# Patient Record
Sex: FEMALE | URBAN - METROPOLITAN AREA
[De-identification: names, ages, dates, MRNs, and addresses within clinical notes are randomized per-mention and may not be internally consistent; named-entity substitution may affect disease eponyms.]

---

## 2019-01-01 ENCOUNTER — OUTPATIENT (OUTPATIENT)
Dept: OUTPATIENT SERVICES | Facility: HOSPITAL | Age: 84
LOS: 1 days | End: 2019-01-01
Payer: MEDICARE

## 2019-01-01 ENCOUNTER — INPATIENT (INPATIENT)
Facility: HOSPITAL | Age: 84
LOS: 10 days | DRG: 871 | End: 2020-01-01
Attending: INTERNAL MEDICINE | Admitting: INTERNAL MEDICINE
Payer: MEDICARE

## 2019-01-01 VITALS
HEART RATE: 95 BPM | DIASTOLIC BLOOD PRESSURE: 65 MMHG | RESPIRATION RATE: 22 BRPM | TEMPERATURE: 98 F | OXYGEN SATURATION: 95 % | SYSTOLIC BLOOD PRESSURE: 133 MMHG

## 2019-01-01 VITALS
OXYGEN SATURATION: 96 % | HEIGHT: 60 IN | DIASTOLIC BLOOD PRESSURE: 54 MMHG | TEMPERATURE: 100 F | HEART RATE: 60 BPM | RESPIRATION RATE: 18 BRPM | SYSTOLIC BLOOD PRESSURE: 112 MMHG | WEIGHT: 110.01 LBS

## 2019-01-01 DIAGNOSIS — J12.9 VIRAL PNEUMONIA, UNSPECIFIED: ICD-10-CM

## 2019-01-01 DIAGNOSIS — Z71.89 OTHER SPECIFIED COUNSELING: ICD-10-CM

## 2019-01-01 DIAGNOSIS — Z90.49 ACQUIRED ABSENCE OF OTHER SPECIFIED PARTS OF DIGESTIVE TRACT: Chronic | ICD-10-CM

## 2019-01-01 DIAGNOSIS — Z96.641 PRESENCE OF RIGHT ARTIFICIAL HIP JOINT: Chronic | ICD-10-CM

## 2019-01-01 LAB
ADD ON TEST-SPECIMEN IN LAB: SIGNIFICANT CHANGE UP
ALBUMIN SERPL ELPH-MCNC: 2.6 G/DL — LOW (ref 3.3–5)
ALP SERPL-CCNC: 86 U/L — SIGNIFICANT CHANGE UP (ref 40–120)
ALT FLD-CCNC: 12 U/L — SIGNIFICANT CHANGE UP (ref 12–78)
ANION GAP SERPL CALC-SCNC: 6 MMOL/L — SIGNIFICANT CHANGE UP (ref 5–17)
ANION GAP SERPL CALC-SCNC: 7 MMOL/L — SIGNIFICANT CHANGE UP (ref 5–17)
ANION GAP SERPL CALC-SCNC: 7 MMOL/L — SIGNIFICANT CHANGE UP (ref 5–17)
ANION GAP SERPL CALC-SCNC: 9 MMOL/L — SIGNIFICANT CHANGE UP (ref 5–17)
APPEARANCE UR: CLEAR — SIGNIFICANT CHANGE UP
APTT BLD: 26.7 SEC — LOW (ref 27.5–36.3)
AST SERPL-CCNC: 14 U/L — LOW (ref 15–37)
BASOPHILS # BLD AUTO: 0.03 K/UL — SIGNIFICANT CHANGE UP (ref 0–0.2)
BASOPHILS # BLD AUTO: 0.07 K/UL — SIGNIFICANT CHANGE UP (ref 0–0.2)
BASOPHILS NFR BLD AUTO: 0.3 % — SIGNIFICANT CHANGE UP (ref 0–2)
BASOPHILS NFR BLD AUTO: 0.8 % — SIGNIFICANT CHANGE UP (ref 0–2)
BILIRUB SERPL-MCNC: 0.5 MG/DL — SIGNIFICANT CHANGE UP (ref 0.2–1.2)
BILIRUB UR-MCNC: NEGATIVE — SIGNIFICANT CHANGE UP
BUN SERPL-MCNC: 13 MG/DL — SIGNIFICANT CHANGE UP (ref 7–23)
BUN SERPL-MCNC: 16 MG/DL — SIGNIFICANT CHANGE UP (ref 7–23)
BUN SERPL-MCNC: 16 MG/DL — SIGNIFICANT CHANGE UP (ref 7–23)
BUN SERPL-MCNC: 18 MG/DL — SIGNIFICANT CHANGE UP (ref 7–23)
CALCIUM SERPL-MCNC: 7.6 MG/DL — LOW (ref 8.5–10.1)
CALCIUM SERPL-MCNC: 7.7 MG/DL — LOW (ref 8.5–10.1)
CALCIUM SERPL-MCNC: 7.8 MG/DL — LOW (ref 8.5–10.1)
CALCIUM SERPL-MCNC: 8.1 MG/DL — LOW (ref 8.5–10.1)
CHLORIDE SERPL-SCNC: 107 MMOL/L — SIGNIFICANT CHANGE UP (ref 96–108)
CHLORIDE SERPL-SCNC: 112 MMOL/L — HIGH (ref 96–108)
CHLORIDE SERPL-SCNC: 112 MMOL/L — HIGH (ref 96–108)
CHLORIDE SERPL-SCNC: 114 MMOL/L — HIGH (ref 96–108)
CO2 SERPL-SCNC: 21 MMOL/L — LOW (ref 22–31)
COLOR SPEC: YELLOW — SIGNIFICANT CHANGE UP
CREAT SERPL-MCNC: 1.16 MG/DL — SIGNIFICANT CHANGE UP (ref 0.5–1.3)
CREAT SERPL-MCNC: 1.3 MG/DL — SIGNIFICANT CHANGE UP (ref 0.5–1.3)
CREAT SERPL-MCNC: 1.35 MG/DL — HIGH (ref 0.5–1.3)
CREAT SERPL-MCNC: 1.64 MG/DL — HIGH (ref 0.5–1.3)
CULTURE RESULTS: SIGNIFICANT CHANGE UP
CULTURE RESULTS: SIGNIFICANT CHANGE UP
DIFF PNL FLD: NEGATIVE — SIGNIFICANT CHANGE UP
EOSINOPHIL # BLD AUTO: 0.47 K/UL — SIGNIFICANT CHANGE UP (ref 0–0.5)
EOSINOPHIL # BLD AUTO: 0.5 K/UL — SIGNIFICANT CHANGE UP (ref 0–0.5)
EOSINOPHIL NFR BLD AUTO: 4.6 % — SIGNIFICANT CHANGE UP (ref 0–6)
EOSINOPHIL NFR BLD AUTO: 5.6 % — SIGNIFICANT CHANGE UP (ref 0–6)
GLUCOSE SERPL-MCNC: 117 MG/DL — HIGH (ref 70–99)
GLUCOSE SERPL-MCNC: 77 MG/DL — SIGNIFICANT CHANGE UP (ref 70–99)
GLUCOSE SERPL-MCNC: 91 MG/DL — SIGNIFICANT CHANGE UP (ref 70–99)
GLUCOSE SERPL-MCNC: 92 MG/DL — SIGNIFICANT CHANGE UP (ref 70–99)
GLUCOSE UR QL: NEGATIVE MG/DL — SIGNIFICANT CHANGE UP
HCT VFR BLD CALC: 31.2 % — LOW (ref 34.5–45)
HCT VFR BLD CALC: 31.8 % — LOW (ref 34.5–45)
HCT VFR BLD CALC: 35.8 % — SIGNIFICANT CHANGE UP (ref 34.5–45)
HGB BLD-MCNC: 10 G/DL — LOW (ref 11.5–15.5)
HGB BLD-MCNC: 11.1 G/DL — LOW (ref 11.5–15.5)
HGB BLD-MCNC: 9.8 G/DL — LOW (ref 11.5–15.5)
IMM GRANULOCYTES NFR BLD AUTO: 0.6 % — SIGNIFICANT CHANGE UP (ref 0–1.5)
IMM GRANULOCYTES NFR BLD AUTO: 0.6 % — SIGNIFICANT CHANGE UP (ref 0–1.5)
INR BLD: 1.1 RATIO — SIGNIFICANT CHANGE UP (ref 0.88–1.16)
KETONES UR-MCNC: NEGATIVE — SIGNIFICANT CHANGE UP
LACTATE SERPL-SCNC: 2.8 MMOL/L — HIGH (ref 0.7–2)
LEUKOCYTE ESTERASE UR-ACNC: NEGATIVE — SIGNIFICANT CHANGE UP
LYMPHOCYTES # BLD AUTO: 1.42 K/UL — SIGNIFICANT CHANGE UP (ref 1–3.3)
LYMPHOCYTES # BLD AUTO: 1.8 K/UL — SIGNIFICANT CHANGE UP (ref 1–3.3)
LYMPHOCYTES # BLD AUTO: 14 % — SIGNIFICANT CHANGE UP (ref 13–44)
LYMPHOCYTES # BLD AUTO: 20.2 % — SIGNIFICANT CHANGE UP (ref 13–44)
MCHC RBC-ENTMCNC: 25.5 PG — LOW (ref 27–34)
MCHC RBC-ENTMCNC: 25.6 PG — LOW (ref 27–34)
MCHC RBC-ENTMCNC: 26.2 PG — LOW (ref 27–34)
MCHC RBC-ENTMCNC: 31 GM/DL — LOW (ref 32–36)
MCHC RBC-ENTMCNC: 31.4 GM/DL — LOW (ref 32–36)
MCHC RBC-ENTMCNC: 31.4 GM/DL — LOW (ref 32–36)
MCV RBC AUTO: 81 FL — SIGNIFICANT CHANGE UP (ref 80–100)
MCV RBC AUTO: 82.5 FL — SIGNIFICANT CHANGE UP (ref 80–100)
MCV RBC AUTO: 83.5 FL — SIGNIFICANT CHANGE UP (ref 80–100)
MONOCYTES # BLD AUTO: 0.93 K/UL — HIGH (ref 0–0.9)
MONOCYTES # BLD AUTO: 1.23 K/UL — HIGH (ref 0–0.9)
MONOCYTES NFR BLD AUTO: 13.8 % — SIGNIFICANT CHANGE UP (ref 2–14)
MONOCYTES NFR BLD AUTO: 9.2 % — SIGNIFICANT CHANGE UP (ref 2–14)
NEUTROPHILS # BLD AUTO: 5.28 K/UL — SIGNIFICANT CHANGE UP (ref 1.8–7.4)
NEUTROPHILS # BLD AUTO: 7.23 K/UL — SIGNIFICANT CHANGE UP (ref 1.8–7.4)
NEUTROPHILS NFR BLD AUTO: 59 % — SIGNIFICANT CHANGE UP (ref 43–77)
NEUTROPHILS NFR BLD AUTO: 71.3 % — SIGNIFICANT CHANGE UP (ref 43–77)
NITRITE UR-MCNC: NEGATIVE — SIGNIFICANT CHANGE UP
PH UR: 5 — SIGNIFICANT CHANGE UP (ref 5–8)
PLATELET # BLD AUTO: 322 K/UL — SIGNIFICANT CHANGE UP (ref 150–400)
PLATELET # BLD AUTO: 338 K/UL — SIGNIFICANT CHANGE UP (ref 150–400)
PLATELET # BLD AUTO: 421 K/UL — HIGH (ref 150–400)
POTASSIUM SERPL-MCNC: 3.8 MMOL/L — SIGNIFICANT CHANGE UP (ref 3.5–5.3)
POTASSIUM SERPL-MCNC: 4 MMOL/L — SIGNIFICANT CHANGE UP (ref 3.5–5.3)
POTASSIUM SERPL-MCNC: 4.1 MMOL/L — SIGNIFICANT CHANGE UP (ref 3.5–5.3)
POTASSIUM SERPL-MCNC: 4.1 MMOL/L — SIGNIFICANT CHANGE UP (ref 3.5–5.3)
POTASSIUM SERPL-SCNC: 3.8 MMOL/L — SIGNIFICANT CHANGE UP (ref 3.5–5.3)
POTASSIUM SERPL-SCNC: 4 MMOL/L — SIGNIFICANT CHANGE UP (ref 3.5–5.3)
POTASSIUM SERPL-SCNC: 4.1 MMOL/L — SIGNIFICANT CHANGE UP (ref 3.5–5.3)
POTASSIUM SERPL-SCNC: 4.1 MMOL/L — SIGNIFICANT CHANGE UP (ref 3.5–5.3)
PROT SERPL-MCNC: 6 GM/DL — SIGNIFICANT CHANGE UP (ref 6–8.3)
PROT UR-MCNC: 15 MG/DL
PROTHROM AB SERPL-ACNC: 12.3 SEC — SIGNIFICANT CHANGE UP (ref 10–12.9)
RAPID RVP RESULT: SIGNIFICANT CHANGE UP
RBC # BLD: 3.81 M/UL — SIGNIFICANT CHANGE UP (ref 3.8–5.2)
RBC # BLD: 3.85 M/UL — SIGNIFICANT CHANGE UP (ref 3.8–5.2)
RBC # BLD: 4.34 M/UL — SIGNIFICANT CHANGE UP (ref 3.8–5.2)
RBC # FLD: 18.4 % — HIGH (ref 10.3–14.5)
RBC # FLD: 18.6 % — HIGH (ref 10.3–14.5)
RBC # FLD: 18.6 % — HIGH (ref 10.3–14.5)
SODIUM SERPL-SCNC: 137 MMOL/L — SIGNIFICANT CHANGE UP (ref 135–145)
SODIUM SERPL-SCNC: 139 MMOL/L — SIGNIFICANT CHANGE UP (ref 135–145)
SODIUM SERPL-SCNC: 140 MMOL/L — SIGNIFICANT CHANGE UP (ref 135–145)
SODIUM SERPL-SCNC: 142 MMOL/L — SIGNIFICANT CHANGE UP (ref 135–145)
SP GR SPEC: 1.02 — SIGNIFICANT CHANGE UP (ref 1.01–1.02)
SPECIMEN SOURCE: SIGNIFICANT CHANGE UP
SPECIMEN SOURCE: SIGNIFICANT CHANGE UP
TROPONIN I SERPL-MCNC: <0.015 NG/ML — SIGNIFICANT CHANGE UP (ref 0.01–0.04)
UROBILINOGEN FLD QL: NEGATIVE MG/DL — SIGNIFICANT CHANGE UP
WBC # BLD: 10.14 K/UL — SIGNIFICANT CHANGE UP (ref 3.8–10.5)
WBC # BLD: 10.92 K/UL — HIGH (ref 3.8–10.5)
WBC # BLD: 8.93 K/UL — SIGNIFICANT CHANGE UP (ref 3.8–10.5)
WBC # FLD AUTO: 10.14 K/UL — SIGNIFICANT CHANGE UP (ref 3.8–10.5)
WBC # FLD AUTO: 10.92 K/UL — HIGH (ref 3.8–10.5)
WBC # FLD AUTO: 8.93 K/UL — SIGNIFICANT CHANGE UP (ref 3.8–10.5)

## 2019-01-01 PROCEDURE — 85027 COMPLETE CBC AUTOMATED: CPT

## 2019-01-01 PROCEDURE — 71250 CT THORAX DX C-: CPT

## 2019-01-01 PROCEDURE — 85025 COMPLETE CBC W/AUTO DIFF WBC: CPT

## 2019-01-01 PROCEDURE — 94640 AIRWAY INHALATION TREATMENT: CPT

## 2019-01-01 PROCEDURE — 36415 COLL VENOUS BLD VENIPUNCTURE: CPT

## 2019-01-01 PROCEDURE — G9001: CPT

## 2019-01-01 PROCEDURE — 71045 X-RAY EXAM CHEST 1 VIEW: CPT

## 2019-01-01 PROCEDURE — 93010 ELECTROCARDIOGRAM REPORT: CPT

## 2019-01-01 PROCEDURE — 93005 ELECTROCARDIOGRAM TRACING: CPT

## 2019-01-01 PROCEDURE — 83605 ASSAY OF LACTIC ACID: CPT

## 2019-01-01 PROCEDURE — 80048 BASIC METABOLIC PNL TOTAL CA: CPT

## 2019-01-01 PROCEDURE — 71045 X-RAY EXAM CHEST 1 VIEW: CPT | Mod: 26

## 2019-01-01 PROCEDURE — 83880 ASSAY OF NATRIURETIC PEPTIDE: CPT

## 2019-01-01 PROCEDURE — 93971 EXTREMITY STUDY: CPT | Mod: LT

## 2019-01-01 PROCEDURE — 93971 EXTREMITY STUDY: CPT | Mod: 26,LT

## 2019-01-01 PROCEDURE — 71250 CT THORAX DX C-: CPT | Mod: 26

## 2019-01-01 RX ORDER — AMOXICILLIN 250 MG/5ML
0 SUSPENSION, RECONSTITUTED, ORAL (ML) ORAL
Qty: 0 | Refills: 0 | DISCHARGE

## 2019-01-01 RX ORDER — METOPROLOL TARTRATE 50 MG
25 TABLET ORAL
Refills: 0 | Status: DISCONTINUED | OUTPATIENT
Start: 2019-01-01 | End: 2020-01-01

## 2019-01-01 RX ORDER — HEPARIN SODIUM 5000 [USP'U]/ML
5000 INJECTION INTRAVENOUS; SUBCUTANEOUS EVERY 12 HOURS
Refills: 0 | Status: DISCONTINUED | OUTPATIENT
Start: 2019-01-01 | End: 2020-01-01

## 2019-01-01 RX ORDER — SENNA PLUS 8.6 MG/1
2 TABLET ORAL AT BEDTIME
Refills: 0 | Status: DISCONTINUED | OUTPATIENT
Start: 2019-01-01 | End: 2020-01-01

## 2019-01-01 RX ORDER — CEFTRIAXONE 500 MG/1
1000 INJECTION, POWDER, FOR SOLUTION INTRAMUSCULAR; INTRAVENOUS EVERY 24 HOURS
Refills: 0 | Status: COMPLETED | OUTPATIENT
Start: 2019-01-01 | End: 2019-01-01

## 2019-01-01 RX ORDER — SODIUM CHLORIDE 0.65 %
2 AEROSOL, SPRAY (ML) NASAL EVERY 4 HOURS
Refills: 0 | Status: DISCONTINUED | OUTPATIENT
Start: 2019-01-01 | End: 2020-01-01

## 2019-01-01 RX ORDER — ONDANSETRON 8 MG/1
4 TABLET, FILM COATED ORAL EVERY 6 HOURS
Refills: 0 | Status: DISCONTINUED | OUTPATIENT
Start: 2019-01-01 | End: 2020-01-01

## 2019-01-01 RX ORDER — ACETAMINOPHEN 500 MG
650 TABLET ORAL ONCE
Refills: 0 | Status: COMPLETED | OUTPATIENT
Start: 2019-01-01 | End: 2019-01-01

## 2019-01-01 RX ORDER — MORPHINE SULFATE 50 MG/1
2 CAPSULE, EXTENDED RELEASE ORAL
Refills: 0 | Status: DISCONTINUED | OUTPATIENT
Start: 2019-01-01 | End: 2020-01-01

## 2019-01-01 RX ORDER — SODIUM CHLORIDE 9 MG/ML
1000 INJECTION INTRAMUSCULAR; INTRAVENOUS; SUBCUTANEOUS
Refills: 0 | Status: DISCONTINUED | OUTPATIENT
Start: 2019-01-01 | End: 2019-01-01

## 2019-01-01 RX ORDER — FERROUS SULFATE 325(65) MG
0.5 TABLET ORAL
Qty: 0 | Refills: 0 | DISCHARGE

## 2019-01-01 RX ORDER — METOPROLOL TARTRATE 50 MG
1 TABLET ORAL
Qty: 0 | Refills: 0 | DISCHARGE

## 2019-01-01 RX ORDER — MEMANTINE HYDROCHLORIDE 10 MG/1
10 TABLET ORAL DAILY
Refills: 0 | Status: DISCONTINUED | OUTPATIENT
Start: 2019-01-01 | End: 2019-01-01

## 2019-01-01 RX ORDER — CEFTRIAXONE 500 MG/1
1000 INJECTION, POWDER, FOR SOLUTION INTRAMUSCULAR; INTRAVENOUS EVERY 24 HOURS
Refills: 0 | Status: DISCONTINUED | OUTPATIENT
Start: 2019-01-01 | End: 2019-01-01

## 2019-01-01 RX ORDER — AZITHROMYCIN 500 MG/1
500 TABLET, FILM COATED ORAL ONCE
Refills: 0 | Status: COMPLETED | OUTPATIENT
Start: 2019-01-01 | End: 2019-01-01

## 2019-01-01 RX ORDER — IPRATROPIUM/ALBUTEROL SULFATE 18-103MCG
3 AEROSOL WITH ADAPTER (GRAM) INHALATION EVERY 6 HOURS
Refills: 0 | Status: DISCONTINUED | OUTPATIENT
Start: 2019-01-01 | End: 2020-01-01

## 2019-01-01 RX ORDER — BUDESONIDE, MICRONIZED 100 %
0.5 POWDER (GRAM) MISCELLANEOUS EVERY 12 HOURS
Refills: 0 | Status: DISCONTINUED | OUTPATIENT
Start: 2019-01-01 | End: 2020-01-01

## 2019-01-01 RX ORDER — ACETAMINOPHEN 500 MG
650 TABLET ORAL EVERY 6 HOURS
Refills: 0 | Status: DISCONTINUED | OUTPATIENT
Start: 2019-01-01 | End: 2020-01-01

## 2019-01-01 RX ORDER — DONEPEZIL HYDROCHLORIDE 10 MG/1
10 TABLET, FILM COATED ORAL AT BEDTIME
Refills: 0 | Status: DISCONTINUED | OUTPATIENT
Start: 2019-01-01 | End: 2019-01-01

## 2019-01-01 RX ORDER — CEFTRIAXONE 500 MG/1
1000 INJECTION, POWDER, FOR SOLUTION INTRAMUSCULAR; INTRAVENOUS ONCE
Refills: 0 | Status: COMPLETED | OUTPATIENT
Start: 2019-01-01 | End: 2019-01-01

## 2019-01-01 RX ORDER — PSEUDOEPHEDRINE HCL 30 MG
0 TABLET ORAL
Qty: 0 | Refills: 0 | DISCHARGE

## 2019-01-01 RX ORDER — SODIUM CHLORIDE 9 MG/ML
3 INJECTION INTRAMUSCULAR; INTRAVENOUS; SUBCUTANEOUS ONCE
Refills: 0 | Status: COMPLETED | OUTPATIENT
Start: 2019-01-01 | End: 2019-01-01

## 2019-01-01 RX ORDER — MEMANTINE HYDROCHLORIDE 10 MG/1
1 TABLET ORAL
Qty: 0 | Refills: 0 | DISCHARGE

## 2019-01-01 RX ORDER — AZITHROMYCIN 500 MG/1
500 TABLET, FILM COATED ORAL EVERY 24 HOURS
Refills: 0 | Status: COMPLETED | OUTPATIENT
Start: 2019-01-01 | End: 2019-01-01

## 2019-01-01 RX ORDER — MORPHINE SULFATE 50 MG/1
2 CAPSULE, EXTENDED RELEASE ORAL EVERY 4 HOURS
Refills: 0 | Status: DISCONTINUED | OUTPATIENT
Start: 2019-01-01 | End: 2019-01-01

## 2019-01-01 RX ORDER — DONEPEZIL HYDROCHLORIDE 10 MG/1
1 TABLET, FILM COATED ORAL
Qty: 0 | Refills: 0 | DISCHARGE

## 2019-01-01 RX ORDER — SODIUM CHLORIDE 9 MG/ML
1500 INJECTION INTRAMUSCULAR; INTRAVENOUS; SUBCUTANEOUS ONCE
Refills: 0 | Status: COMPLETED | OUTPATIENT
Start: 2019-01-01 | End: 2019-01-01

## 2019-01-01 RX ORDER — FLUTICASONE PROPIONATE 50 MCG
1 SPRAY, SUSPENSION NASAL
Refills: 0 | Status: DISCONTINUED | OUTPATIENT
Start: 2019-01-01 | End: 2020-01-01

## 2019-01-01 RX ADMIN — Medication 1 SPRAY(S): at 05:50

## 2019-01-01 RX ADMIN — Medication 1 SPRAY(S): at 05:32

## 2019-01-01 RX ADMIN — HEPARIN SODIUM 5000 UNIT(S): 5000 INJECTION INTRAVENOUS; SUBCUTANEOUS at 05:07

## 2019-01-01 RX ADMIN — HEPARIN SODIUM 5000 UNIT(S): 5000 INJECTION INTRAVENOUS; SUBCUTANEOUS at 18:20

## 2019-01-01 RX ADMIN — CEFTRIAXONE 1000 MILLIGRAM(S): 500 INJECTION, POWDER, FOR SOLUTION INTRAMUSCULAR; INTRAVENOUS at 15:44

## 2019-01-01 RX ADMIN — DONEPEZIL HYDROCHLORIDE 10 MILLIGRAM(S): 10 TABLET, FILM COATED ORAL at 21:35

## 2019-01-01 RX ADMIN — SODIUM CHLORIDE 1500 MILLILITER(S): 9 INJECTION INTRAMUSCULAR; INTRAVENOUS; SUBCUTANEOUS at 21:58

## 2019-01-01 RX ADMIN — Medication 1200 MILLIGRAM(S): at 05:51

## 2019-01-01 RX ADMIN — MORPHINE SULFATE 2 MILLIGRAM(S): 50 CAPSULE, EXTENDED RELEASE ORAL at 18:17

## 2019-01-01 RX ADMIN — Medication 0.25 MILLIGRAM(S): at 23:29

## 2019-01-01 RX ADMIN — Medication 1200 MILLIGRAM(S): at 18:20

## 2019-01-01 RX ADMIN — SODIUM CHLORIDE 1500 MILLILITER(S): 9 INJECTION INTRAMUSCULAR; INTRAVENOUS; SUBCUTANEOUS at 23:10

## 2019-01-01 RX ADMIN — Medication 3 MILLILITER(S): at 13:44

## 2019-01-01 RX ADMIN — MORPHINE SULFATE 2 MILLIGRAM(S): 50 CAPSULE, EXTENDED RELEASE ORAL at 12:59

## 2019-01-01 RX ADMIN — CEFTRIAXONE 1000 MILLIGRAM(S): 500 INJECTION, POWDER, FOR SOLUTION INTRAMUSCULAR; INTRAVENOUS at 21:58

## 2019-01-01 RX ADMIN — Medication 0.5 MILLIGRAM(S): at 09:39

## 2019-01-01 RX ADMIN — Medication 0.5 MILLIGRAM(S): at 17:59

## 2019-01-01 RX ADMIN — AZITHROMYCIN 255 MILLIGRAM(S): 500 TABLET, FILM COATED ORAL at 01:34

## 2019-01-01 RX ADMIN — Medication 3 MILLILITER(S): at 20:50

## 2019-01-01 RX ADMIN — MORPHINE SULFATE 2 MILLIGRAM(S): 50 CAPSULE, EXTENDED RELEASE ORAL at 11:44

## 2019-01-01 RX ADMIN — MORPHINE SULFATE 2 MILLIGRAM(S): 50 CAPSULE, EXTENDED RELEASE ORAL at 13:30

## 2019-01-01 RX ADMIN — MEMANTINE HYDROCHLORIDE 10 MILLIGRAM(S): 10 TABLET ORAL at 11:01

## 2019-01-01 RX ADMIN — HEPARIN SODIUM 5000 UNIT(S): 5000 INJECTION INTRAVENOUS; SUBCUTANEOUS at 17:42

## 2019-01-01 RX ADMIN — DONEPEZIL HYDROCHLORIDE 10 MILLIGRAM(S): 10 TABLET, FILM COATED ORAL at 22:09

## 2019-01-01 RX ADMIN — HEPARIN SODIUM 5000 UNIT(S): 5000 INJECTION INTRAVENOUS; SUBCUTANEOUS at 05:41

## 2019-01-01 RX ADMIN — Medication 25 MILLIGRAM(S): at 17:43

## 2019-01-01 RX ADMIN — SODIUM CHLORIDE 3 MILLILITER(S): 9 INJECTION INTRAMUSCULAR; INTRAVENOUS; SUBCUTANEOUS at 21:24

## 2019-01-01 RX ADMIN — AZITHROMYCIN 500 MILLIGRAM(S): 500 TABLET, FILM COATED ORAL at 01:09

## 2019-01-01 RX ADMIN — Medication 0.5 MILLIGRAM(S): at 09:37

## 2019-01-01 RX ADMIN — Medication 1 SPRAY(S): at 05:07

## 2019-01-01 RX ADMIN — Medication 1 SPRAY(S): at 05:16

## 2019-01-01 RX ADMIN — Medication 650 MILLIGRAM(S): at 21:10

## 2019-01-01 RX ADMIN — HEPARIN SODIUM 5000 UNIT(S): 5000 INJECTION INTRAVENOUS; SUBCUTANEOUS at 17:22

## 2019-01-01 RX ADMIN — MORPHINE SULFATE 2 MILLIGRAM(S): 50 CAPSULE, EXTENDED RELEASE ORAL at 11:30

## 2019-01-01 RX ADMIN — HEPARIN SODIUM 5000 UNIT(S): 5000 INJECTION INTRAVENOUS; SUBCUTANEOUS at 17:26

## 2019-01-01 RX ADMIN — Medication 0.5 MILLIGRAM(S): at 08:05

## 2019-01-01 RX ADMIN — HEPARIN SODIUM 5000 UNIT(S): 5000 INJECTION INTRAVENOUS; SUBCUTANEOUS at 05:50

## 2019-01-01 RX ADMIN — Medication 1 SPRAY(S): at 17:59

## 2019-01-01 RX ADMIN — DONEPEZIL HYDROCHLORIDE 10 MILLIGRAM(S): 10 TABLET, FILM COATED ORAL at 21:10

## 2019-01-01 RX ADMIN — CEFTRIAXONE 1000 MILLIGRAM(S): 500 INJECTION, POWDER, FOR SOLUTION INTRAMUSCULAR; INTRAVENOUS at 21:11

## 2019-01-01 RX ADMIN — HEPARIN SODIUM 5000 UNIT(S): 5000 INJECTION INTRAVENOUS; SUBCUTANEOUS at 17:59

## 2019-01-01 RX ADMIN — HEPARIN SODIUM 5000 UNIT(S): 5000 INJECTION INTRAVENOUS; SUBCUTANEOUS at 05:26

## 2019-01-01 RX ADMIN — CEFTRIAXONE 1000 MILLIGRAM(S): 500 INJECTION, POWDER, FOR SOLUTION INTRAMUSCULAR; INTRAVENOUS at 15:55

## 2019-01-01 RX ADMIN — HEPARIN SODIUM 5000 UNIT(S): 5000 INJECTION INTRAVENOUS; SUBCUTANEOUS at 05:17

## 2019-01-01 RX ADMIN — Medication 650 MILLIGRAM(S): at 22:15

## 2019-01-01 RX ADMIN — Medication 3 MILLILITER(S): at 20:49

## 2019-01-01 RX ADMIN — Medication 0.5 MILLIGRAM(S): at 20:01

## 2019-01-01 RX ADMIN — Medication 0.5 MILLIGRAM(S): at 20:22

## 2019-01-01 RX ADMIN — MORPHINE SULFATE 2 MILLIGRAM(S): 50 CAPSULE, EXTENDED RELEASE ORAL at 15:33

## 2019-01-01 RX ADMIN — Medication 25 MILLIGRAM(S): at 05:56

## 2019-01-01 RX ADMIN — CEFTRIAXONE 1000 MILLIGRAM(S): 500 INJECTION, POWDER, FOR SOLUTION INTRAMUSCULAR; INTRAVENOUS at 22:09

## 2019-01-01 RX ADMIN — SODIUM CHLORIDE 75 MILLILITER(S): 9 INJECTION INTRAMUSCULAR; INTRAVENOUS; SUBCUTANEOUS at 18:44

## 2019-01-01 RX ADMIN — Medication 1 SPRAY(S): at 17:22

## 2019-01-01 RX ADMIN — Medication 0.5 MILLIGRAM(S): at 20:50

## 2019-01-01 RX ADMIN — Medication 25 MILLIGRAM(S): at 05:32

## 2019-01-01 RX ADMIN — HEPARIN SODIUM 5000 UNIT(S): 5000 INJECTION INTRAVENOUS; SUBCUTANEOUS at 17:07

## 2019-01-01 RX ADMIN — MORPHINE SULFATE 2 MILLIGRAM(S): 50 CAPSULE, EXTENDED RELEASE ORAL at 20:45

## 2019-01-01 RX ADMIN — Medication 25 MILLIGRAM(S): at 05:16

## 2019-01-01 RX ADMIN — Medication 3 MILLILITER(S): at 09:38

## 2019-01-01 RX ADMIN — Medication 25 MILLIGRAM(S): at 18:33

## 2019-01-01 RX ADMIN — MORPHINE SULFATE 2 MILLIGRAM(S): 50 CAPSULE, EXTENDED RELEASE ORAL at 15:18

## 2019-01-01 RX ADMIN — DONEPEZIL HYDROCHLORIDE 10 MILLIGRAM(S): 10 TABLET, FILM COATED ORAL at 21:20

## 2019-01-01 RX ADMIN — Medication 1200 MILLIGRAM(S): at 05:41

## 2019-01-01 RX ADMIN — SODIUM CHLORIDE 60 MILLILITER(S): 9 INJECTION INTRAMUSCULAR; INTRAVENOUS; SUBCUTANEOUS at 12:00

## 2019-01-01 RX ADMIN — DONEPEZIL HYDROCHLORIDE 10 MILLIGRAM(S): 10 TABLET, FILM COATED ORAL at 21:33

## 2019-01-01 RX ADMIN — CEFTRIAXONE 1000 MILLIGRAM(S): 500 INJECTION, POWDER, FOR SOLUTION INTRAMUSCULAR; INTRAVENOUS at 21:19

## 2019-01-01 RX ADMIN — Medication 3 MILLILITER(S): at 08:32

## 2019-01-01 RX ADMIN — HEPARIN SODIUM 5000 UNIT(S): 5000 INJECTION INTRAVENOUS; SUBCUTANEOUS at 18:08

## 2019-01-01 RX ADMIN — CEFTRIAXONE 1000 MILLIGRAM(S): 500 INJECTION, POWDER, FOR SOLUTION INTRAMUSCULAR; INTRAVENOUS at 21:35

## 2019-01-01 RX ADMIN — MEMANTINE HYDROCHLORIDE 10 MILLIGRAM(S): 10 TABLET ORAL at 12:13

## 2019-01-01 RX ADMIN — SODIUM CHLORIDE 60 MILLILITER(S): 9 INJECTION INTRAMUSCULAR; INTRAVENOUS; SUBCUTANEOUS at 05:50

## 2019-01-01 RX ADMIN — Medication 0.5 MILLIGRAM(S): at 08:08

## 2019-01-01 RX ADMIN — Medication 25 MILLIGRAM(S): at 05:07

## 2019-01-01 RX ADMIN — Medication 3 MILLILITER(S): at 20:01

## 2019-01-01 RX ADMIN — Medication 3 MILLILITER(S): at 13:36

## 2019-01-01 RX ADMIN — Medication 0.5 MILLIGRAM(S): at 18:08

## 2019-01-01 RX ADMIN — HEPARIN SODIUM 5000 UNIT(S): 5000 INJECTION INTRAVENOUS; SUBCUTANEOUS at 05:51

## 2019-01-01 RX ADMIN — Medication 25 MILLIGRAM(S): at 18:09

## 2019-01-01 RX ADMIN — Medication 3 MILLILITER(S): at 14:04

## 2019-01-01 RX ADMIN — Medication 1200 MILLIGRAM(S): at 05:32

## 2019-01-01 RX ADMIN — Medication 3 MILLILITER(S): at 08:08

## 2019-01-01 RX ADMIN — MORPHINE SULFATE 2 MILLIGRAM(S): 50 CAPSULE, EXTENDED RELEASE ORAL at 18:14

## 2019-01-01 RX ADMIN — Medication 3 MILLILITER(S): at 01:31

## 2019-01-01 RX ADMIN — Medication 3 MILLILITER(S): at 23:05

## 2019-01-01 RX ADMIN — AZITHROMYCIN 255 MILLIGRAM(S): 500 TABLET, FILM COATED ORAL at 23:53

## 2019-01-01 RX ADMIN — HEPARIN SODIUM 5000 UNIT(S): 5000 INJECTION INTRAVENOUS; SUBCUTANEOUS at 20:07

## 2019-01-01 RX ADMIN — Medication 25 MILLIGRAM(S): at 20:07

## 2019-01-01 RX ADMIN — Medication 1 SPRAY(S): at 17:26

## 2019-01-01 RX ADMIN — AZITHROMYCIN 255 MILLIGRAM(S): 500 TABLET, FILM COATED ORAL at 00:18

## 2019-01-01 RX ADMIN — Medication 25 MILLIGRAM(S): at 17:26

## 2019-01-01 RX ADMIN — Medication 25 MILLIGRAM(S): at 05:51

## 2019-01-01 RX ADMIN — Medication 3 MILLILITER(S): at 07:47

## 2019-01-01 RX ADMIN — Medication 1 SPRAY(S): at 18:23

## 2019-01-01 RX ADMIN — Medication 3 MILLILITER(S): at 08:04

## 2019-01-01 RX ADMIN — MORPHINE SULFATE 2 MILLIGRAM(S): 50 CAPSULE, EXTENDED RELEASE ORAL at 11:29

## 2019-01-01 RX ADMIN — Medication 1 SPRAY(S): at 18:09

## 2019-01-01 RX ADMIN — Medication 0.5 MILLIGRAM(S): at 08:32

## 2019-01-01 RX ADMIN — MORPHINE SULFATE 2 MILLIGRAM(S): 50 CAPSULE, EXTENDED RELEASE ORAL at 11:09

## 2019-01-01 RX ADMIN — MEMANTINE HYDROCHLORIDE 10 MILLIGRAM(S): 10 TABLET ORAL at 12:23

## 2019-01-01 RX ADMIN — Medication 1200 MILLIGRAM(S): at 17:26

## 2019-01-01 RX ADMIN — AZITHROMYCIN 255 MILLIGRAM(S): 500 TABLET, FILM COATED ORAL at 01:08

## 2019-01-01 RX ADMIN — Medication 1 SPRAY(S): at 17:44

## 2019-01-01 RX ADMIN — Medication 1 SPRAY(S): at 05:52

## 2019-01-01 RX ADMIN — DONEPEZIL HYDROCHLORIDE 10 MILLIGRAM(S): 10 TABLET, FILM COATED ORAL at 21:19

## 2019-01-01 RX ADMIN — Medication 3 MILLILITER(S): at 20:22

## 2019-01-01 RX ADMIN — MEMANTINE HYDROCHLORIDE 10 MILLIGRAM(S): 10 TABLET ORAL at 12:50

## 2019-01-01 RX ADMIN — MEMANTINE HYDROCHLORIDE 10 MILLIGRAM(S): 10 TABLET ORAL at 11:20

## 2019-01-01 RX ADMIN — HEPARIN SODIUM 5000 UNIT(S): 5000 INJECTION INTRAVENOUS; SUBCUTANEOUS at 05:32

## 2019-01-01 RX ADMIN — Medication 0.5 MILLIGRAM(S): at 06:00

## 2019-01-01 RX ADMIN — Medication 0.5 MILLIGRAM(S): at 07:47

## 2019-01-01 RX ADMIN — Medication 2 SPRAY(S): at 18:21

## 2019-01-01 RX ADMIN — MORPHINE SULFATE 2 MILLIGRAM(S): 50 CAPSULE, EXTENDED RELEASE ORAL at 04:27

## 2019-01-01 RX ADMIN — MORPHINE SULFATE 2 MILLIGRAM(S): 50 CAPSULE, EXTENDED RELEASE ORAL at 17:59

## 2019-01-01 RX ADMIN — AZITHROMYCIN 255 MILLIGRAM(S): 500 TABLET, FILM COATED ORAL at 00:09

## 2019-01-01 RX ADMIN — Medication 3 MILLILITER(S): at 09:39

## 2019-01-01 RX ADMIN — Medication 3 MILLILITER(S): at 01:00

## 2019-12-21 NOTE — ED PROVIDER NOTE - CONSTITUTIONAL [+], MLM
+lethargic +decreased PO intake +generalized weakness +lethargic +decreased PO intake +generalized weakness/FEVER

## 2019-12-21 NOTE — ED ADULT NURSE REASSESSMENT NOTE - NS ED NURSE REASSESS COMMENT FT1
patient increasingly agitated, constantly yelling trying to climb out of bed, 1:1 in place for safety

## 2019-12-21 NOTE — ED PROVIDER NOTE - OBJECTIVE STATEMENT
89 y/o F with PMHx of HTN, dementia presenting to the ED c/o weakness x3 weeks. +decreased PO intake +fever(99.8) Per daughter, pt reports that she got the flu shot x3 weeks with Dr. Pandya, and started to spit up phlegm and have new onset cough. Pt was taken back to Med clinic, and told her they had a sinus infection, and was treated with amoxacillin and Sudafed. Patient did not see any improvement, and normally is able to walk at baseline, but has been increasingly weak, and could not walk today.   Denies any dysuria, 89 y/o F with PMHx of HTN, dementia presenting to the ED c/o weakness x3 weeks. +decreased PO intake +fever(99.8) Per daughter, pt reports that she got the flu shot x3 weeks with Dr. Pandya, and started to spit up phlegm and have new onset cough. Pt was taken back to Med clinic, and told her they had a sinus infection, and was treated with amoxacillin and Sudafed. Patient did not see any improvement, and normally is able to walk at baseline, but has been increasingly weak, and could not walk today. Patient was brought ot he ED by daughter for further evaluation.  Denies any dysuria, N/V/D.

## 2019-12-21 NOTE — ED PROVIDER NOTE - CONSTITUTIONAL, MLM
normal... Well appearing, awake, alert, oriented to person, place, time/situation and in no apparent distress. +agitation

## 2019-12-21 NOTE — ED ADULT NURSE REASSESSMENT NOTE - NS ED NURSE REASSESS COMMENT FT1
patient bp stable, unable to complete full set of sepsis vitals d/t patient pulling bp cuff off, will check vitals.

## 2019-12-21 NOTE — ED ADULT NURSE NOTE - CHPI ED NUR SYMPTOMS NEG
no chills/no vomiting/no tingling/no decreased eating/drinking/no dizziness/no fever/no pain/no nausea

## 2019-12-21 NOTE — ED ADULT TRIAGE NOTE - CHIEF COMPLAINT QUOTE
PAtient comes to ED for general weakness and fatigue for 3 weeks. Failure to thrive over the last 3 days. denies any pain or discomfort

## 2019-12-21 NOTE — ED ADULT NURSE NOTE - OBJECTIVE STATEMENT
PAtient comes to ED for general weakness and fatigue for 3 weeks. Failure to thrive over the last 3 days. denies any pain or discomfort. Pt has hx of dementia.

## 2019-12-22 NOTE — H&P ADULT - HISTORY OF PRESENT ILLNESS
89 y.o female with PMH of HTN and Dementia was brought by EMS for weakness and chills. Pt Is unable to provide history  due to dementia and also was given ativan overnight due to agitation.  History  obtained from Pts daughter who reports that Pt was having cough and looking weak for couple of days, was seen at Dr Ray's office and start Tx on amoxicillin and Sudafed for suspected sinusitis on 12/18. Cough somewhat got better, but Pt was getting weaker and start having chills and was c/o feeling cold.  Also Pt had min oral intake.  Daughter noted that Pts appetite and Po intake decreased over past 6 month but had min food or fluids past 1-2 days. No fevers. No other complains from Pt but as per daughter has bad dementia and can not provide history.   On my exam Pt is not cooperative, wants to go to sleep, denies pain      In ED: temp 102, mildly elevated lactate, improved with IVF bolus.  CXR as per ED possible PNA,.  BCX sent. Pt started on Azithromycin and Ceftriaxone

## 2019-12-22 NOTE — H&P ADULT - NSHPPHYSICALEXAM_GEN_ALL_CORE
General: Well developed; malnourished; in no acute distress  Eyes: PERRLA; conjunctiva and sclera clear  Head: Normocephalic; atraumatic  ENMT: No nasal discharge; airway clear  Neck: Supple; non tender; no masses  Respiratory: Decreased BS b/l.  No wheezes, rales or rhonchi  Cardiovascular: Regular rate and rhythm. S1 and S2 Normal; No murmurs  Gastrointestinal: Soft non-tender non-distended; Normal bowel sounds  Genitourinary: No  suprapubic fullness  Extremities: Normal range of motion, No  edema  Vascular: Peripheral pulses palpable 2+ bilaterally  Neurological: Alert and oriented x1, confused   Skin: Warm and dry.   Musculoskeletal: Normal muscle tone, without deformities

## 2019-12-22 NOTE — H&P ADULT - ASSESSMENT
89 y.o female with PMH of HTN and Dementia admitted for:     1.  Febrile syndrome/Sepsis  suspect PNA  admit to med sugr  CXR reviewed  Will order CT chest to further evaluate   S/p IVF bolus and IV Abxs  Lactate improved  C/w IV Ceftriaxone and Azithromycin  Gentle hydration   F/u BCX  Mucinex      2. Elevated CR, likely PARVIZ due to poor oral intake  vs CKD    S/p IVF bolus and  start gentle hydration   Repeat labs   Monitor UO         4. HTN  BP stable  C/w Metoprolol       5. Advanced  Dementia   supportive care  Fall precautions   C/w Namenda, decrease dose as CrCL low   C/w Aricept   PT eval       6. Severe Protein Calorie Malnutrition   due to  advancing dementia and  poor oral intake  supplements   Dietician eval        7. DVT PPXs      8. ACP.  Results, Pts condition and prognosis discussed with Pts Jabari.  Also reviewed MOLST form, daughter states that Pt is DNR/DNI,  discussed feeding tube but she will think about it.   Time spent 10 min

## 2019-12-23 NOTE — PHYSICAL THERAPY INITIAL EVALUATION ADULT - MANUAL MUSCLE TESTING RESULTS, REHAB EVAL
grossly assessed due to/difficulty following formal strength testing procedures ,appears >3+/5 thruout , >4/5 bilaterally

## 2019-12-23 NOTE — PHYSICAL THERAPY INITIAL EVALUATION ADULT - GAIT DISTANCE, PT EVAL
asking to sit down almost immediately,very nervous,anxious when mobilizing ; tremulous/shakey/bed to chair

## 2019-12-23 NOTE — PROGRESS NOTE ADULT - ASSESSMENT
89 y.o female with PMH of HTN and Dementia admitted for:     1. Febrile syndrome/Sepsis  due to acute bronchitis/ PNA  CT chest showed beronchial thickening and opacities and possible mass   S/p IVF bolus and IV Abxs  Lactate improved  C/w IV Ceftriaxone and Azithromycin  S/p Gentle hydration   F/u BCX: NGTD  Mucinex  D/w Dr Owusu, will c/w IV Abxs, will need repeat CT vs PET CT in 2 months outPt       2. Elevated CR, likely PARVIZ due to poor oral intake  vs CKD    S/p IVF bolus and  gentle hydration   BUN/CR improved   Monitor UO       4. HTN  BP stable  C/w Metoprolol       5. Advanced  Dementia   supportive care  Fall precautions   C/w Namenda, decrease dose as CrCL low   C/w Aricept   PT eval       6. Severe Protein Calorie Malnutrition   due to  advancing dementia and  poor oral intake  supplements   Dietician eval        7. DVT PPXs      8. ACP.  Results, Pts condition and prognosis discussed with Pts Jabari.  Also reviewed MOLST form, daughter states that Pt is DNR/DNI,  discussed feeding tube but she will think about it.   Time spent 10 min     Pts HCP Daughter Марина  called, updated on findings and POC, d/c planning discussed

## 2019-12-23 NOTE — PHYSICAL THERAPY INITIAL EVALUATION ADULT - CRITERIA FOR SKILLED THERAPEUTIC INTERVENTIONS
anticipated discharge recommendation/rehab potential/therapy frequency/predicted duration of therapy intervention/anticipated equipment needs at discharge/risk reduction/prevention/functional limitations in following categories/impairments found

## 2019-12-23 NOTE — CONSULT NOTE ADULT - ASSESSMENT
PROBLEMS:    Febrile syndrome/Sepsis possible pneumonia/asthmatic bronchitis  GAIL mass  HTN   Advanced  Dementia  Severe Protein Calorie Malnutrition     PLAN;    IV RHOCEPHIN/AZITHROMYCIN  PAT CT CHEST GAIL MASS VS SCAR NEEDS FU CT VS PET SCAN AS OUTPAT IN 2 MONTHS  SUPPORTIVE CARE  DVT PROPHYLASIX

## 2019-12-23 NOTE — PHYSICAL THERAPY INITIAL EVALUATION ADULT - IMPAIRMENTS FOUND, PT EVAL
arousal, attention, and cognition/gait, locomotion, and balance/muscle strength/aerobic capacity/endurance/cognitive impairment/tone

## 2019-12-23 NOTE — PROGRESS NOTE ADULT - SUBJECTIVE AND OBJECTIVE BOX
CC: weakness (23 Dec 2019 15:57)    HPI: 89 y.o female with PMH of HTN and Dementia was brought by EMS for weakness and chills. Pt Is unable to provide history  due to dementia and also was given ativan overnight due to agitation.  History  obtained from Pts daughter who reports that Pt was having cough and looking weak for couple of days, was seen at Dr Ray's office and start Tx on amoxicillin and Sudafed for suspected sinusitis on . Cough somewhat got better, but Pt was getting weaker and start having chills and was c/o feeling cold.  Also Pt had min oral intake.  Daughter noted that Pts appetite and Po intake decreased over past 6 month but had min food or fluids past 1-2 days. No fevers. No other complains from Pt but as per daughter has bad dementia and can not provide history.    In ED: temp 102, mildly elevated lactate, improved with IVF bolus.  CXR as per ED possible PNA,.  BCX sent. Pt started on Azithromycin and Ceftriaxone (22 Dec 2019 09:38)    INTERVAL HPI/ OVERNIGHT EVENTS: Pt was seen and examined, OOB to chair, looks anxious, shivering on/off. VS stable.  Was able to eat some food.     Vital Signs Last 24 Hrs  T(C): 37.3 (23 Dec 2019 11:04), Max: 37.3 (23 Dec 2019 11:04)  T(F): 99.2 (23 Dec 2019 11:04), Max: 99.2 (23 Dec 2019 11:04)  HR: 107 (23 Dec 2019 17:24) (73 - 107)  BP: 150/63 (23 Dec 2019 17:24) (118/58 - 150/63)  RR: 18 (23 Dec 2019 17:24) (18 - 18)  SpO2: 98% (23 Dec 2019 17:24) (96% - 98%)      REVIEW OF SYSTEMS:  All other review of systems is negative unless indicated above.      PHYSICAL EXAM:  General: Well developed; malnourished;  anxious   Eyes: PERRLA; conjunctiva and sclera clear  Head: Normocephalic; atraumatic  ENMT: No nasal discharge; airway clear  Neck: Supple; non tender; no masses	  Respiratory: Decreased BS b/l.  No wheezes, rales or rhonchi  Cardiovascular: Regular rate and rhythm. S1 and S2 Normal; No murmurs  Gastrointestinal: Soft non-tender non-distended; Normal bowel sounds  Genitourinary: No  suprapubic fullness  Extremities: Normal range of motion, No  edema  Vascular: Peripheral pulses palpable 2+ bilaterally  Neurological: Alert and oriented x1, confused   Skin: Warm and dry.   Musculoskeletal: Normal muscle tone, without deformities      LABS     CARDIAC MARKERS ( 21 Dec 2019 21:07 )  <0.015 ng/mL / x     / x     / x     / x                                10.0   8.93  )-----------( 322      ( 22 Dec 2019 11:48 )             31.8     23 Dec 2019 08:27    140    |  112    |  13     ----------------------------<  91     4.0     |  21     |  1.16     Ca    7.7        23 Dec 2019 08:27    TPro  6.0    /  Alb  2.6    /  TBili  0.5    /  DBili  x      /  AST  14     /  ALT  12     /  AlkPhos  86     21 Dec 2019 21:07  PT/INR - ( 21 Dec 2019 21:07 )   PT: 12.3 sec;   INR: 1.10 ratio    PTT - ( 21 Dec 2019 21:07 )  PTT:26.7 sec  LIVER FUNCTIONS - ( 21 Dec 2019 21:07 )  Alb: 2.6 g/dL / Pro: 6.0 gm/dL / ALK PHOS: 86 U/L / ALT: 12 U/L / AST: 14 U/L / GGT: x           Urinalysis Basic - ( 21 Dec 2019 21:07 )  Color: Yellow / Appearance: Clear / S.020 / pH: x  Gluc: x / Ketone: Negative  / Bili: Negative / Urobili: Negative mg/dL   Blood: x / Protein: 15 mg/dL / Nitrite: Negative   Leuk Esterase: Negative / RBC: Negative /HPF / WBC 0-2   Sq Epi: x / Non Sq Epi: Few / Bacteria: Negative    Culture - Blood (19 @ 21:07)    Specimen Source: .Blood Blood    Culture Results:   No growth to date.      MEDICATIONS  (STANDING):  albuterol/ipratropium for Nebulization 3 milliLiter(s) Nebulizer every 6 hours  azithromycin  IVPB 500 milliGRAM(s) IV Intermittent every 24 hours  buDESOnide    Inhalation Suspension 0.5 milliGRAM(s) Inhalation every 12 hours  cefTRIAXone Injectable. 1000 milliGRAM(s) IV Push every 24 hours  donepezil 10 milliGRAM(s) Oral at bedtime  fluticasone propionate 50 MICROgram(s)/spray Nasal Spray 1 Spray(s) Both Nostrils two times a day  guaiFENesin ER 1200 milliGRAM(s) Oral every 12 hours  heparin  Injectable 5000 Unit(s) SubCutaneous every 12 hours  memantine 10 milliGRAM(s) Oral daily  metoprolol tartrate 25 milliGRAM(s) Oral two times a day  sodium chloride 0.9%. 1000 milliLiter(s) (75 mL/Hr) IV Continuous <Continuous>    MEDICATIONS  (PRN):  acetaminophen   Tablet .. 650 milliGRAM(s) Oral every 6 hours PRN Mild Pain (1 - 3)  aluminum hydroxide/magnesium hydroxide/simethicone Suspension 30 milliLiter(s) Oral every 4 hours PRN Dyspepsia  bisacodyl 5 milliGRAM(s) Oral daily PRN Constipation  ondansetron Injectable 4 milliGRAM(s) IV Push every 6 hours PRN Nausea  senna 2 Tablet(s) Oral at bedtime PRN Constipation  sodium chloride 0.65% Nasal 2 Spray(s) Both Nostrils every 4 hours PRN Nasal Congestion      RADIOLOGY & ADDITIONAL TESTS:  < from: CT Chest No Cont (12..19 @ 09:57) >    EXAM:  CT CHEST                            PROCEDURE DATE:  2019          INTERPRETATION:  CT CHEST WITHOUT CONTRAST    Clinical Indication: Comments, weakness and fevers    Technique: Axial CT images of the chest were obtained from the lung apices to the diaphragms without IV contrast.  Coronal and sagittal reformatted images were created and reviewed.    Comparison: Prior radiograph of the chest from today, 2019. Radiograph of the chest from 2014. CT of the chest from 2014.    Findings:  Biapical pleural-based scarring/thickening is more prominent compared to the prior study from . There is a 2.6 x 1.7 x 1.8 cm pleural-based, masslike opacity at the left apex (for example see coronal image 40, series 601), which isnew compared to the prior exam.    There is a small right pleural effusion and trace left pleural effusion. Mild patchy groundglass opacities are seen bilaterally, most prominent in the right lower lobe. Mild tree-in-bud nodularity is also seen in the lateral left upper lobe. There is mild diffuse bronchial wall thickening with scattered foci of airway impaction, most prominent in the right lower lobe. Emphysematous changes are seen at the bilateral apices. Linear appearing coarsening of bronchovascular markings at the right upper lobe likely represents sequelae of old infection.  There is no pneumothorax. Thickening of interlobular septae could represent underlying interstitial edema. Scattered 3 mm calcified granulomata are noted.    There is a 1.1 cm short axis pretracheal node, without significant change from prior study from 2014. A few additional prominent/borderline enlarged mediastinal nodes are also without significant change. There is no axillary lymphadenopathy.  Evaluation forhilar lymphadenopathy is limited without IV contrast, however there is no gross hilar lymphadenopathy. Evaluation of the heart demonstrates coronary artery calcifications and a prominent left atrium. Aortic annular versus aortic valvular calcifications are noted. The blood pool in the heart is hypodense relative to myocardium, suggesting anemia. There is no sizable pericardial effusion. The ascending and descending aorta are not enlarged. Moderate atherosclerotic calcifications of the thoracic aorta are noted. The pulmonary artery is not enlarged.    Periportal edema is suspected in the liver. The included upper abdomen is otherwise grossly unremarkable although limited without oral or IV contrast.    No aggressive osseous lesion is identified.    IMPRESSION:  1.  There is a 2.6 x 1.7 x 1.8 cm pleural-based, masslike opacity at the left apex (for example see coronal image 40, series 601), which is new compared to the prior 2014 exam. Recommend further assessment with PET CT and/or tissue biopsy.  In addition, recommend comparison to more recent CTs of the chest, if available.      2.  Small right pleural effusion and trace left pleural effusion. Mild patchy groundglass opacities are seen bilaterally, most prominent in the right lower lobe. Mild tree-in-bud nodularity is also seen in the lateral left upper lobe. Mild diffuse bronchial wall thickening with scattered foci of airway impaction, most prominent in the right lower lobe.  Constellation of findings is suggestive of age indeterminate bronchitis with small airways infection/inflammation.  Recommend clinical correlation and followup study in 4 to 6 weeks following treatment to ensure resolution.    3.  Emphysematous changes are seen at the bilateral apices. Linear appearing coarsening of bronchovascular markings at the right upper lobe likely represents sequelae of old infection.       4.  Thickening of interlobular septae could represent underlying interstitial edema.    5.  The blood pool in the heart is hypodense relative to myocardium, suggesting anemia. Correlate with laboratory values.    6.  Coronary artery calcifications and a prominent left atrium. Aortic annular versus aortic valvular calcifications are noted..     7.  Prominent/borderline enlarged mediastinal nodes are without significant change from 2014.    < end of copied text >

## 2019-12-23 NOTE — PHYSICAL THERAPY INITIAL EVALUATION ADULT - LEVEL OF INDEPENDENCE: STAND/SIT, REHAB EVAL
minimum assist (75% patients effort)/moderate assist (50% patients effort)/needed repeated cueing to sit and reassure her chair behind her

## 2019-12-23 NOTE — CONSULT NOTE ADULT - SUBJECTIVE AND OBJECTIVE BOX
HPI:    89 y.o.f with PMH of HTN and Dementia was admitted by EMS for weakness and chills. Pt with dementia and also was given ativan overnight due to agitation.  Pt admitted with cough and looking weak for couple of days, was seen at Dr Ray's office and start Tx on amoxicillin and Sudafed for suspected sinusitis on . Cough somewhat got better, but Pt was getting weaker and start having chills and was c/o feeling cold.  Also Pt had min oral intake.  No fevers. In ED, temp 102, mildly elevated lactate, improved with IVF bolus.  CXR as per ED possible PNA,.  Pt was started on Azithromycin and Ceftriaxone, pat had CT chest asked to see for lung mass. no much history because of hx of dementia.    PAST MEDICAL & SURGICAL HISTORY:  Dementia  HTN (hypertension)  S/P cholecystectomy  S/P hip replacement, right      Home Medications:  amoxicillin: since  (22 Dec 2019 09:36)  Aricept 10 mg oral tablet: 1 tab(s) orally once a day (at bedtime) (22 Dec 2019 09:36)  ferrous sulfate 325 mg (65 mg elemental iron) oral tablet: 0.5 tab(s) orally once a day (22 Dec 2019 09:36)  metoprolol tartrate 25 mg oral tablet: 1 tab(s) orally 2 times a day (22 Dec 2019 09:36)  Namenda XR 14 mg oral capsule, extended release: 1 cap(s) orally once a day (22 Dec 2019 09:36)  Sudafed:  (22 Dec 2019 09:36)      MEDICATIONS  (STANDING):  albuterol/ipratropium for Nebulization 3 milliLiter(s) Nebulizer every 6 hours  azithromycin  IVPB 500 milliGRAM(s) IV Intermittent every 24 hours  buDESOnide    Inhalation Suspension 0.5 milliGRAM(s) Inhalation every 12 hours  cefTRIAXone Injectable. 1000 milliGRAM(s) IV Push every 24 hours  donepezil 10 milliGRAM(s) Oral at bedtime  fluticasone propionate 50 MICROgram(s)/spray Nasal Spray 1 Spray(s) Both Nostrils two times a day  guaiFENesin ER 1200 milliGRAM(s) Oral every 12 hours  heparin  Injectable 5000 Unit(s) SubCutaneous every 12 hours  memantine 10 milliGRAM(s) Oral daily  metoprolol tartrate 25 milliGRAM(s) Oral two times a day  sodium chloride 0.9%. 1000 milliLiter(s) (75 mL/Hr) IV Continuous <Continuous>    MEDICATIONS  (PRN):  acetaminophen   Tablet .. 650 milliGRAM(s) Oral every 6 hours PRN Mild Pain (1 - 3)  aluminum hydroxide/magnesium hydroxide/simethicone Suspension 30 milliLiter(s) Oral every 4 hours PRN Dyspepsia  bisacodyl 5 milliGRAM(s) Oral daily PRN Constipation  ondansetron Injectable 4 milliGRAM(s) IV Push every 6 hours PRN Nausea  senna 2 Tablet(s) Oral at bedtime PRN Constipation  sodium chloride 0.65% Nasal 2 Spray(s) Both Nostrils every 4 hours PRN Nasal Congestion      Allergies    No Known Allergies    Intolerances        SOCIAL HISTORY: Denies tobacco, etoh abuse or illicit drug use    FAMILY HISTORY:  FH: liver cancer: mother  FH ischemic heart disease: father      Vital Signs Last 24 Hrs  T(C): 37.3 (23 Dec 2019 11:04), Max: 37.3 (23 Dec 2019 11:04)  T(F): 99.2 (23 Dec 2019 11:04), Max: 99.2 (23 Dec 2019 11:04)  HR: 76 (23 Dec 2019 11:04) (66 - 86)  BP: 148/70 (23 Dec 2019 11:04) (103/37 - 148/70)  BP(mean): --  RR: 18 (23 Dec 2019 11:04) (18 - 18)  SpO2: 96% (23 Dec 2019 11:04) (96% - 98%)        REVIEW OF SYSTEMS:    CONSTITUTIONAL:  As per HPI. poor history dementia    PHYSICAL EXAMINATION:    GENERAL APPEARANCE:  Pt. is not currently dyspneic, in no distress. Pt. is alert, oriented, and pleasant.  HEENT:  Pupils are normal and react normally. No icterus. Mucous membranes well colored.  NECK:  Supple. No lymphadenopathy. Jugular venous pressure not elevated. Carotids equal.   HEART:   The cardiac impulse has a normal quality. Regular. Normal S1 and S2. There are no murmurs, rubs or gallops noted  CHEST:  Chest crackles to auscultation. Normal respiratory effort.  ABDOMEN:  Soft and nontender.   EXTREMITIES:  There is no cyanosis, clubbing or edema.   SKIN:  No rash or significant lesions are noted.    LABS:                        10.0   8.93  )-----------( 322      ( 22 Dec 2019 11:48 )             31.8         140  |  112<H>  |  13  ----------------------------<  91  4.0   |  21<L>  |  1.16    Ca    7.7<L>      23 Dec 2019 08:27    TPro  6.0  /  Alb  2.6<L>  /  TBili  0.5  /  DBili  x   /  AST  14<L>  /  ALT  12  /  AlkPhos  86  12    LIVER FUNCTIONS - ( 21 Dec 2019 21:07 )  Alb: 2.6 g/dL / Pro: 6.0 gm/dL / ALK PHOS: 86 U/L / ALT: 12 U/L / AST: 14 U/L / GGT: x           PT/INR - ( 21 Dec 2019 21:07 )   PT: 12.3 sec;   INR: 1.10 ratio         PTT - ( 21 Dec 2019 21:07 )  PTT:26.7 sec  CARDIAC MARKERS ( 21 Dec 2019 21:07 )  <0.015 ng/mL / x     / x     / x     / x          Urinalysis Basic - ( 21 Dec 2019 21:07 )    Color: Yellow / Appearance: Clear / S.020 / pH: x  Gluc: x / Ketone: Negative  / Bili: Negative / Urobili: Negative mg/dL   Blood: x / Protein: 15 mg/dL / Nitrite: Negative   Leuk Esterase: Negative / RBC: Negative /HPF / WBC 0-2   Sq Epi: x / Non Sq Epi: Few / Bacteria: Negative          Culture - Blood (collected 19 @ 21:07)  Source: .Blood Blood  Preliminary Report (19 @ 11:02):    No growth to date.    Culture - Blood (collected 19 @ 21:07)  Source: .Blood Blood  Preliminary Report (19 @ 11:02):    No growth to date.    RADIOLOGY & ADDITIONAL STUDIES:     CT Chest No Cont (19 @ 09:57) >  IMPRESSION:  1.  There is a 2.6 x 1.7 x 1.8 cm pleural-based, masslike opacity at the left apex (for example see coronal image 40, series 601), which is new compared to the prior 2014 exam. Recommend further assessment with PET CT and/or tissue biopsy.  In addition, recommend comparison to more recent CTs of the chest, if available.      2.  Small right pleural effusion and trace left pleural effusion. Mild patchy groundglass opacities are seen bilaterally, most prominent in the right lower lobe. Mild tree-in-bud nodularity is also seen in the lateral left upper lobe. Mild diffuse bronchial wall thickening with scattered foci of airway impaction, most prominent in the right lower lobe.  Constellation of findings is suggestive of age indeterminate bronchitis with small airways infection/inflammation.  Recommend clinical correlation and followup study in 4 to 6 weeks following treatment to ensure resolution.    3.  Emphysematous changes are seen at the bilateral apices. Linear appearing coarsening of bronchovascular markings at the right upper lobe likely represents sequelae of old infection.       4.  Thickening of interlobular septae could represent underlying interstitial edema.    5.  The blood pool in the heart is hypodense relative to myocardium, suggesting anemia. Correlate with laboratory values.    6.  Coronary artery calcifications and a prominent left atrium. Aortic annular versus aortic valvular calcifications are noted..     7.  Prominent/borderline enlarged mediastinal nodes are without significant change from 2014.

## 2019-12-23 NOTE — PHYSICAL THERAPY INITIAL EVALUATION ADULT - SKIN COLOR/CHARACTERISTICS
redness blanchable/gluteal cleft appears mildly irritated,sensitive during hygeine care after BM to diaper

## 2019-12-24 NOTE — PROGRESS NOTE ADULT - ASSESSMENT
89 y.o female with PMH of HTN and Dementia admitted for:     1. Febrile syndrome/Sepsis  due to acute bronchitis/ suspected PNA  CT chest showed beronchial thickening and opacities and possible mass   S/p IVF bolus and IV Abxs  Lactate improved  C/w IV Ceftriaxone and Azithromycin  S/p Gentle hydration   F/u BCX: NGTD  Mucinex      2. Elevated CR, likely PARVIZ due to poor oral intake  vs CKD    S/p IVF bolus and  gentle hydration   Monitor UO       4. HTN  BP stable  C/w Metoprolol       5. Advanced  Dementia   supportive care  Fall precautions   C/w Namenda, decrease dose as CrCL low   C/w Aricept   PT eval       6. Severe Protein Calorie Malnutrition   due to  advancing dementia and  poor oral intake  supplements   Dietician eval        7. DVT PPXs

## 2019-12-24 NOTE — DIETITIAN INITIAL EVALUATION ADULT. - PERTINENT LABORATORY DATA
12-24 Na139 mmol/L Glu 92 mg/dL K+ 3.8 mmol/L Cr  1.35 mg/dL<H> BUN 16 mg/dL Phos n/a   Alb n/a   PAB n/a

## 2019-12-24 NOTE — DIETITIAN INITIAL EVALUATION ADULT. - MALNUTRITION
Pt meets criteria for moderate malnutrition in the context of chronic illness AEB moderate muscle wasting, moderate fat depletion, PO intake < 75% ENN > 1 month

## 2019-12-24 NOTE — DIETITIAN INITIAL EVALUATION ADULT. - ENERGY INTAKE
Pt unable to provide detailed hx. As per RN pt has very minimal po intake. Pt appears very fearful currently which may be effecting PO intake however pt noted in H&p as per daughter w/ decreased PO intake over the past 6 mnths w/ further decline in intake over the past 2 days. Poor (<50%)/Pt has likely not been meeting at least 75% ENN over the past 6 months.

## 2019-12-24 NOTE — DIETITIAN INITIAL EVALUATION ADULT. - ADD RECOMMEND
1. liberalize diet to low Na 2. add ensure enlive BID 3. provide maximum assistance w/ meals/supplements 4. add MVI w/ minerals 5. weekly wt.

## 2019-12-24 NOTE — PROGRESS NOTE ADULT - SUBJECTIVE AND OBJECTIVE BOX
Subjective:    pat better, lying in bed, 1:1 observation.    Home Medications:  amoxicillin: since 12/18 (22 Dec 2019 09:36)  Aricept 10 mg oral tablet: 1 tab(s) orally once a day (at bedtime) (22 Dec 2019 09:36)  ferrous sulfate 325 mg (65 mg elemental iron) oral tablet: 0.5 tab(s) orally once a day (22 Dec 2019 09:36)  metoprolol tartrate 25 mg oral tablet: 1 tab(s) orally 2 times a day (22 Dec 2019 09:36)  Namenda XR 14 mg oral capsule, extended release: 1 cap(s) orally once a day (22 Dec 2019 09:36)  Sudafed:  (22 Dec 2019 09:36)    MEDICATIONS  (STANDING):  albuterol/ipratropium for Nebulization 3 milliLiter(s) Nebulizer every 6 hours  azithromycin  IVPB 500 milliGRAM(s) IV Intermittent every 24 hours  buDESOnide    Inhalation Suspension 0.5 milliGRAM(s) Inhalation every 12 hours  cefTRIAXone Injectable. 1000 milliGRAM(s) IV Push every 24 hours  donepezil 10 milliGRAM(s) Oral at bedtime  fluticasone propionate 50 MICROgram(s)/spray Nasal Spray 1 Spray(s) Both Nostrils two times a day  guaiFENesin ER 1200 milliGRAM(s) Oral every 12 hours  heparin  Injectable 5000 Unit(s) SubCutaneous every 12 hours  memantine 10 milliGRAM(s) Oral daily  metoprolol tartrate 25 milliGRAM(s) Oral two times a day    MEDICATIONS  (PRN):  acetaminophen   Tablet .. 650 milliGRAM(s) Oral every 6 hours PRN Mild Pain (1 - 3)  aluminum hydroxide/magnesium hydroxide/simethicone Suspension 30 milliLiter(s) Oral every 4 hours PRN Dyspepsia  bisacodyl 5 milliGRAM(s) Oral daily PRN Constipation  ondansetron Injectable 4 milliGRAM(s) IV Push every 6 hours PRN Nausea  senna 2 Tablet(s) Oral at bedtime PRN Constipation  sodium chloride 0.65% Nasal 2 Spray(s) Both Nostrils every 4 hours PRN Nasal Congestion      Allergies    No Known Allergies    Intolerances        Vital Signs Last 24 Hrs  T(C): 36.8 (24 Dec 2019 04:59), Max: 37.4 (23 Dec 2019 20:17)  T(F): 98.2 (24 Dec 2019 04:59), Max: 99.3 (23 Dec 2019 20:17)  HR: 76 (24 Dec 2019 08:05) (70 - 107)  BP: 147/85 (24 Dec 2019 04:59) (95/55 - 150/63)  BP(mean): --  RR: 18 (24 Dec 2019 04:59) (18 - 18)  SpO2: 100% (24 Dec 2019 04:59) (93% - 100%)      PHYSICAL EXAMINATION:    NECK:  Supple. No lymphadenopathy. Jugular venous pressure not elevated. Carotids equal.   HEART:   The cardiac impulse has a normal quality. Reg., Nl S1 and S2.  There are no murmurs, rubs or gallops noted  CHEST:  Chest crackles to auscultation. Normal respiratory effort.  ABDOMEN:  Soft and nontender.   EXTREMITIES:  There is no edema.       LABS:                        9.8    10.92 )-----------( 338      ( 24 Dec 2019 08:23 )             31.2     12-24    139  |  112<H>  |  16  ----------------------------<  92  3.8   |  21<L>  |  1.35<H>    Ca    7.8<L>      24 Dec 2019 08:23

## 2019-12-24 NOTE — PROGRESS NOTE ADULT - SUBJECTIVE AND OBJECTIVE BOX
CC: weakness (23 Dec 2019 15:57)    HPI: 89 y.o female with PMH of HTN and Dementia was brought by EMS for weakness and chills. Pt Is unable to provide history  due to dementia and also was given ativan overnight due to agitation.  History  obtained from Pts daughter who reports that Pt was having cough and looking weak for couple of days, was seen at Dr Ray's office and start Tx on amoxicillin and Sudafed for suspected sinusitis on 12/18. Cough somewhat got better, but Pt was getting weaker and start having chills and was c/o feeling cold.  Also Pt had min oral intake.  Daughter noted that Pts appetite and Po intake decreased over past 6 month but had min food or fluids past 1-2 days. No fevers. No other complains from Pt but as per daughter has bad dementia and can not provide history.    In ED: temp 102, mildly elevated lactate, improved with IVF bolus.  CXR as per ED possible PNA,.  BCX sent. Pt started on Azithromycin and Ceftriaxone (22 Dec 2019 09:38)    INTERVAL HPI/ OVERNIGHT EVENTS: Pt was seen and examined, OOB to chair, looks anxious, shivering on/off. VS stable.  Was able to eat some food.   12/24/19: Patient seen and examined. On CO for safety. Confused.    Vital Signs Last 24 Hrs  T(C): 37 (24 Dec 2019 14:09), Max: 37.6 (24 Dec 2019 11:56)  T(F): 98.6 (24 Dec 2019 14:09), Max: 99.7 (24 Dec 2019 11:56)  HR: 80 (24 Dec 2019 11:56) (70 - 107)  BP: 131/60 (24 Dec 2019 11:56) (95/55 - 150/63)  BP(mean): --  RR: 17 (24 Dec 2019 11:56) (17 - 18)  SpO2: 96% (24 Dec 2019 11:56) (93% - 100%)      REVIEW OF SYSTEMS:  All other review of systems is negative unless indicated above.      PHYSICAL EXAM:  General: Well developed; malnourished;  anxious, confused   Eyes: PERRLA; conjunctiva and sclera clear  Head: Normocephalic; atraumatic  ENMT: No nasal discharge; airway clear  Neck: Supple; non tender; no masses	  Respiratory: Decreased BS b/l.  No wheezes, rales or rhonchi  Cardiovascular: Regular rate and rhythm. S1 and S2 Normal; No murmurs  Gastrointestinal: Soft non-tender non-distended; Normal bowel sounds  Genitourinary: No  suprapubic fullness  Extremities: Normal range of motion, No  edema  Vascular: Peripheral pulses palpable 2+ bilaterally  Neurological: Alert and oriented x1, confused   Skin: Warm and dry.   Musculoskeletal: Normal muscle tone, without deformities      LABS                               9.8    10.92 )-----------( 338      ( 24 Dec 2019 08:23 )             31.2     24 Dec 2019 08:23    139    |  112    |  16     ----------------------------<  92     3.8     |  21     |  1.35     Ca    7.8        24 Dec 2019 08:23            MEDICATIONS  (STANDING):  albuterol/ipratropium for Nebulization 3 milliLiter(s) Nebulizer every 6 hours  azithromycin  IVPB 500 milliGRAM(s) IV Intermittent every 24 hours  buDESOnide    Inhalation Suspension 0.5 milliGRAM(s) Inhalation every 12 hours  cefTRIAXone Injectable. 1000 milliGRAM(s) IV Push every 24 hours  donepezil 10 milliGRAM(s) Oral at bedtime  fluticasone propionate 50 MICROgram(s)/spray Nasal Spray 1 Spray(s) Both Nostrils two times a day  guaiFENesin ER 1200 milliGRAM(s) Oral every 12 hours  heparin  Injectable 5000 Unit(s) SubCutaneous every 12 hours  memantine 10 milliGRAM(s) Oral daily  metoprolol tartrate 25 milliGRAM(s) Oral two times a day  sodium chloride 0.9%. 1000 milliLiter(s) (75 mL/Hr) IV Continuous <Continuous>    MEDICATIONS  (PRN):  acetaminophen   Tablet .. 650 milliGRAM(s) Oral every 6 hours PRN Mild Pain (1 - 3)  aluminum hydroxide/magnesium hydroxide/simethicone Suspension 30 milliLiter(s) Oral every 4 hours PRN Dyspepsia  bisacodyl 5 milliGRAM(s) Oral daily PRN Constipation  ondansetron Injectable 4 milliGRAM(s) IV Push every 6 hours PRN Nausea  senna 2 Tablet(s) Oral at bedtime PRN Constipation  sodium chloride 0.65% Nasal 2 Spray(s) Both Nostrils every 4 hours PRN Nasal Congestion

## 2019-12-24 NOTE — CHART NOTE - NSCHARTNOTEFT_GEN_A_CORE
Upon Nutritional Assessment by the Registered Dietitian your patient was determined to meet criteria / has evidence of the following diagnosis/diagnoses:          [ ]  Mild Protein Calorie Malnutrition        [x ]  Moderate Protein Calorie Malnutrition        [ ] Severe Protein Calorie Malnutrition        [ ] Unspecified Protein Calorie Malnutrition        [ ] Underweight / BMI <19        [ ] Morbid Obesity / BMI > 40      Findings:    Pt meets criteria for moderate malnutrition in the context of chronic illness AEB moderate muscle wasting, moderate fat depletion, PO intake < 75% ENN > 1 month      Findings as based on:  •  Comprehensive nutrition assessment and consultation  •  Calorie counts (nutrient intake analysis)  •  Food acceptance and intake status from observations by staff  •  Follow up  •  Patient education  •  Intervention secondary to interdisciplinary rounds  •   concerns      Treatment:      1. liberalize diet to low Na   2. add ensure enlive BID   3. provide maximum assistance w/ meals/supplements   4. add MVI w/ minerals   5. weekly wt.      The following diet has been recommended:      PROVIDER Section:     By signing this assessment you are acknowledging and agree with the diagnosis/diagnoses assigned by the Registered Dietitian    Comments:

## 2019-12-24 NOTE — DISCHARGE NOTE NURSING/CASE MANAGEMENT/SOCIAL WORK - PATIENT PORTAL LINK FT
You can access the FollowMyHealth Patient Portal offered by [facility] by registering at the following website: [email or web address]. By joining [employer]’s FollowMyHealth portal, you will also be able to view your health information using other applications (apps) compatible with our system.

## 2019-12-24 NOTE — DIETITIAN INITIAL EVALUATION ADULT. - PHYSICAL APPEARANCE
underweight/BMI 22.5/other (specify) NFPE performed and significant for muscle wasting: moderate: temporal, clavicle, deltoid; fat depletion: moderate: occipital and buccal regions.   asif 15  no noted edema.   no noted skin breakdown.

## 2019-12-24 NOTE — DIETITIAN INITIAL EVALUATION ADULT. - PERTINENT MEDS FT
MEDICATIONS  (STANDING):  albuterol/ipratropium for Nebulization 3 milliLiter(s) Nebulizer every 6 hours  azithromycin  IVPB 500 milliGRAM(s) IV Intermittent every 24 hours  buDESOnide    Inhalation Suspension 0.5 milliGRAM(s) Inhalation every 12 hours  cefTRIAXone Injectable. 1000 milliGRAM(s) IV Push every 24 hours  donepezil 10 milliGRAM(s) Oral at bedtime  fluticasone propionate 50 MICROgram(s)/spray Nasal Spray 1 Spray(s) Both Nostrils two times a day  guaiFENesin ER 1200 milliGRAM(s) Oral every 12 hours  heparin  Injectable 5000 Unit(s) SubCutaneous every 12 hours  memantine 10 milliGRAM(s) Oral daily  metoprolol tartrate 25 milliGRAM(s) Oral two times a day    MEDICATIONS  (PRN):  acetaminophen   Tablet .. 650 milliGRAM(s) Oral every 6 hours PRN Mild Pain (1 - 3)  aluminum hydroxide/magnesium hydroxide/simethicone Suspension 30 milliLiter(s) Oral every 4 hours PRN Dyspepsia  bisacodyl 5 milliGRAM(s) Oral daily PRN Constipation  ondansetron Injectable 4 milliGRAM(s) IV Push every 6 hours PRN Nausea  senna 2 Tablet(s) Oral at bedtime PRN Constipation  sodium chloride 0.65% Nasal 2 Spray(s) Both Nostrils every 4 hours PRN Nasal Congestion

## 2019-12-24 NOTE — DIETITIAN INITIAL EVALUATION ADULT. - OTHER INFO
HTN and Dementia was brought by EMS for weakness and chills. Pt Is unable to provide history  due to dementia and also was given ativan overnight due to agitation.  History  obtained from Pts daughter who reports that Pt was having cough and looking weak for couple of days, was seen at Dr Ray's office and start Tx on amoxicillin and Sudafed for suspected sinusitis on 12/18. Cough somewhat got better, but Pt was getting weaker and start having chills and was c/o feeling cold.  Also Pt had min oral intake.  Daughter noted that Pts appetite and Po intake decreased over past 6 month but had min food or fluids past 1-2 days. No fevers. No other complains from Pt but as per daughter has bad dementia and can not provide history.   On my exam Pt is not cooperative, wants to go to sleep, denies pain    In ED: temp 102, mildly elevated lactate, improved with IVF bolus.  CXR as per ED possible PNA,.  BCX sent. Pt started on Azithromycin and Ceftriaxone     Upon visit pt w/ 1:1 at bedside. Nursing assistant at bedside reports that they were not present during recent meal. D/w RN; details below. Pt appears very timid during exam however allows RD to perform NFPE; details below. No documented wt hx and pt is a poor historian and unable to provide details. No noted difficulty chewing/swallowing.

## 2019-12-24 NOTE — PROGRESS NOTE ADULT - ASSESSMENT
PROBLEMS:    Febrile syndrome/Sepsis possible pneumonia/asthmatic bronchitis  GAIL mass  HTN  Advanced  Dementia  Severe Protein Calorie Malnutrition     PLAN;    PULMONARY STABLE  IV RHOCEPHIN/AZITHROMYCIN  PAT CT CHEST GAIL MASS VS SCAR NEEDS FU CT VS PET SCAN AS OUTPAT IN 2 MONTHS  SUPPORTIVE CARE  D/W STAFF   DVT PROPHYLASIX

## 2019-12-25 NOTE — PROGRESS NOTE ADULT - SUBJECTIVE AND OBJECTIVE BOX
CC: weakness (23 Dec 2019 15:57)    HPI: 89 y.o female with PMH of HTN and Dementia was brought by EMS for weakness and chills. Pt Is unable to provide history  due to dementia and also was given ativan overnight due to agitation.  History  obtained from Pts daughter who reports that Pt was having cough and looking weak for couple of days, was seen at Dr Ray's office and start Tx on amoxicillin and Sudafed for suspected sinusitis on 12/18. Cough somewhat got better, but Pt was getting weaker and start having chills and was c/o feeling cold.  Also Pt had min oral intake.  Daughter noted that Pts appetite and Po intake decreased over past 6 month but had min food or fluids past 1-2 days. No fevers. No other complains from Pt but as per daughter has bad dementia and can not provide history.    In ED: temp 102, mildly elevated lactate, improved with IVF bolus.  CXR as per ED possible PNA,.  BCX sent. Pt started on Azithromycin and Ceftriaxone (22 Dec 2019 09:38)    INTERVAL HPI/ OVERNIGHT EVENTS: Pt was seen and examined, OOB to chair, looks anxious, shivering on/off. VS stable.  Was able to eat some food.   12/24/19: Patient seen and examined. On CO for safety. Confused.  12/25/19: Patient seen and examined. No new issues.     Vital Signs Last 24 Hrs  T(C): 36.7 (25 Dec 2019 12:05), Max: 37 (24 Dec 2019 21:15)  T(F): 98 (25 Dec 2019 12:05), Max: 98.6 (24 Dec 2019 21:15)  HR: 58 (25 Dec 2019 12:05) (58 - 98)  BP: 123/54 (25 Dec 2019 12:05) (123/54 - 139/92)  BP(mean): --  RR: 18 (25 Dec 2019 05:04) (18 - 18)  SpO2: 96% (25 Dec 2019 12:05) (96% - 100%)      REVIEW OF SYSTEMS:  All other review of systems is negative unless indicated above.      PHYSICAL EXAM:  General: Well developed; malnourished;  anxious, confused   Eyes: PERRLA; conjunctiva and sclera clear  Head: Normocephalic; atraumatic  ENMT: No nasal discharge; airway clear  Neck: Supple; non tender; no masses	  Respiratory: Decreased BS b/l.  No wheezes, rales or rhonchi  Cardiovascular: Regular rate and rhythm. S1 and S2 Normal; No murmurs  Gastrointestinal: Soft non-tender non-distended; Normal bowel sounds  Genitourinary: No  suprapubic fullness  Extremities: Normal range of motion, No  edema  Vascular: Peripheral pulses palpable 2+ bilaterally  Neurological: Alert and oriented x1, confused   Skin: Warm and dry.   Musculoskeletal: Normal muscle tone, without deformities      LABS                               9.8    10.92 )-----------( 338      ( 24 Dec 2019 08:23 )             31.2     24 Dec 2019 08:23    139    |  112    |  16     ----------------------------<  92     3.8     |  21     |  1.35     Ca    7.8        24 Dec 2019 08:23        MEDICATIONS  (STANDING):  albuterol/ipratropium for Nebulization 3 milliLiter(s) Nebulizer every 6 hours  azithromycin  IVPB 500 milliGRAM(s) IV Intermittent every 24 hours  buDESOnide    Inhalation Suspension 0.5 milliGRAM(s) Inhalation every 12 hours  cefTRIAXone Injectable. 1000 milliGRAM(s) IV Push every 24 hours  donepezil 10 milliGRAM(s) Oral at bedtime  fluticasone propionate 50 MICROgram(s)/spray Nasal Spray 1 Spray(s) Both Nostrils two times a day  guaiFENesin ER 1200 milliGRAM(s) Oral every 12 hours  heparin  Injectable 5000 Unit(s) SubCutaneous every 12 hours  memantine 10 milliGRAM(s) Oral daily  metoprolol tartrate 25 milliGRAM(s) Oral two times a day  sodium chloride 0.9%. 1000 milliLiter(s) (75 mL/Hr) IV Continuous <Continuous>    MEDICATIONS  (PRN):  acetaminophen   Tablet .. 650 milliGRAM(s) Oral every 6 hours PRN Mild Pain (1 - 3)  aluminum hydroxide/magnesium hydroxide/simethicone Suspension 30 milliLiter(s) Oral every 4 hours PRN Dyspepsia  bisacodyl 5 milliGRAM(s) Oral daily PRN Constipation  ondansetron Injectable 4 milliGRAM(s) IV Push every 6 hours PRN Nausea  senna 2 Tablet(s) Oral at bedtime PRN Constipation  sodium chloride 0.65% Nasal 2 Spray(s) Both Nostrils every 4 hours PRN Nasal Congestion

## 2019-12-25 NOTE — PROGRESS NOTE ADULT - ASSESSMENT
89 y.o female with PMH of HTN and Dementia admitted for:     1. Febrile syndrome/Sepsis  due to acute bronchitis/ suspected PNA  CT chest showed beronchial thickening and opacities and possible mass   S/p IVF bolus and IV Abxs  Lactate improved  C/w IV Ceftriaxone and Azithromycin  S/p Gentle hydration   F/u BCX: NGTD  Mucinex      2. Elevated CR, likely PARVIZ due to poor oral intake  vs CKD    S/p IVF bolus and  gentle hydration   Monitor UO       4. HTN  BP stable  C/w Metoprolol       5. Advanced  Dementia   supportive care  Fall precautions   C/w Namenda, decrease dose as CrCL low   C/w Aricept   PT eval       6. Moderate Protein Calorie Malnutrition   due to  advancing dementia and  poor oral intake  supplements   Dietician eval  appreciated      7. DVT PPXs

## 2019-12-25 NOTE — PROGRESS NOTE ADULT - SUBJECTIVE AND OBJECTIVE BOX
Subjective:    pat lying in bed, no respiratory distress.    Home Medications:  amoxicillin: since 12/18 (22 Dec 2019 09:36)  Aricept 10 mg oral tablet: 1 tab(s) orally once a day (at bedtime) (22 Dec 2019 09:36)  ferrous sulfate 325 mg (65 mg elemental iron) oral tablet: 0.5 tab(s) orally once a day (22 Dec 2019 09:36)  metoprolol tartrate 25 mg oral tablet: 1 tab(s) orally 2 times a day (22 Dec 2019 09:36)  Namenda XR 14 mg oral capsule, extended release: 1 cap(s) orally once a day (22 Dec 2019 09:36)  Sudafed:  (22 Dec 2019 09:36)    MEDICATIONS  (STANDING):  albuterol/ipratropium for Nebulization 3 milliLiter(s) Nebulizer every 6 hours  azithromycin  IVPB 500 milliGRAM(s) IV Intermittent every 24 hours  buDESOnide    Inhalation Suspension 0.5 milliGRAM(s) Inhalation every 12 hours  cefTRIAXone Injectable. 1000 milliGRAM(s) IV Push every 24 hours  donepezil 10 milliGRAM(s) Oral at bedtime  fluticasone propionate 50 MICROgram(s)/spray Nasal Spray 1 Spray(s) Both Nostrils two times a day  guaiFENesin ER 1200 milliGRAM(s) Oral every 12 hours  heparin  Injectable 5000 Unit(s) SubCutaneous every 12 hours  memantine 10 milliGRAM(s) Oral daily  metoprolol tartrate 25 milliGRAM(s) Oral two times a day    MEDICATIONS  (PRN):  acetaminophen   Tablet .. 650 milliGRAM(s) Oral every 6 hours PRN Mild Pain (1 - 3)  aluminum hydroxide/magnesium hydroxide/simethicone Suspension 30 milliLiter(s) Oral every 4 hours PRN Dyspepsia  bisacodyl 5 milliGRAM(s) Oral daily PRN Constipation  ondansetron Injectable 4 milliGRAM(s) IV Push every 6 hours PRN Nausea  senna 2 Tablet(s) Oral at bedtime PRN Constipation  sodium chloride 0.65% Nasal 2 Spray(s) Both Nostrils every 4 hours PRN Nasal Congestion      Allergies    No Known Allergies    Intolerances        Vital Signs Last 24 Hrs  T(C): 36.6 (25 Dec 2019 05:04), Max: 37.6 (24 Dec 2019 11:56)  T(F): 97.9 (25 Dec 2019 05:04), Max: 99.7 (24 Dec 2019 11:56)  HR: 85 (25 Dec 2019 08:52) (66 - 98)  BP: 132/94 (25 Dec 2019 05:04) (126/68 - 139/92)  BP(mean): --  RR: 18 (25 Dec 2019 05:04) (17 - 18)  SpO2: 100% (25 Dec 2019 05:04) (96% - 100%)      PHYSICAL EXAMINATION:    NECK:  Supple. No lymphadenopathy. Jugular venous pressure not elevated. Carotids equal.   HEART:   The cardiac impulse has a normal quality. Reg., Nl S1 and S2.  There are no murmurs, rubs or gallops noted  CHEST:  Chest is clear to auscultation. Normal respiratory effort.  ABDOMEN:  Soft and nontender.   EXTREMITIES:  There is no edema.       LABS:                        9.8    10.92 )-----------( 338      ( 24 Dec 2019 08:23 )             31.2     12-24    139  |  112<H>  |  16  ----------------------------<  92  3.8   |  21<L>  |  1.35<H>    Ca    7.8<L>      24 Dec 2019 08:23

## 2019-12-26 NOTE — PROGRESS NOTE ADULT - ASSESSMENT
PROBLEMS:    Febrile syndrome/Sepsis possible pneumonia/asthmatic bronchitis  GAIL mass  HTN  Advanced  Dementia  Severe Protein Calorie Malnutrition     PLAN;    PULMONARY STABLE  off abx  PAT CT CHEST GAIL MASS VS SCAR NEEDS FU CT VS PET SCAN AS OUTPAT IN 2 MONTHS  SUPPORTIVE CARE  D/W STAFF   DVT PROPHYLASIX

## 2019-12-26 NOTE — PROGRESS NOTE ADULT - ASSESSMENT
89 y.o female with PMH of HTN and Dementia admitted for:     1. Febrile syndrome/Sepsis  due to acute bronchitis/ suspected PNA  CT chest showed beronchial thickening and opacities and possible mass   Lactate improved  C/w IV Ceftriaxone  S/p Gentle hydration   F/u BCX: NGTD  Mucinex      2. Elevated CR, likely PARVIZ due to poor oral intake  vs CKD    S/p IVF bolus and  gentle hydration   Monitor UO       4. HTN  BP stable  C/w Metoprolol       5. Advanced  Dementia   supportive care  Fall precautions   C/w Namenda, decrease dose as CrCL low   C/w Aricept   Palliative eval    6. Moderate Protein Calorie Malnutrition   due to  advancing dementia and  poor oral intake  supplements   Dietician eval  appreciated      7. DVT PPXs

## 2019-12-26 NOTE — PROGRESS NOTE ADULT - SUBJECTIVE AND OBJECTIVE BOX
CC: weakness (23 Dec 2019 15:57)    HPI: 89 y.o female with PMH of HTN and Dementia was brought by EMS for weakness and chills. Pt Is unable to provide history  due to dementia and also was given ativan overnight due to agitation.  History  obtained from Pts daughter who reports that Pt was having cough and looking weak for couple of days, was seen at Dr Ray's office and start Tx on amoxicillin and Sudafed for suspected sinusitis on 12/18. Cough somewhat got better, but Pt was getting weaker and start having chills and was c/o feeling cold.  Also Pt had min oral intake.  Daughter noted that Pts appetite and Po intake decreased over past 6 month but had min food or fluids past 1-2 days. No fevers. No other complains from Pt but as per daughter has bad dementia and can not provide history.    In ED: temp 102, mildly elevated lactate, improved with IVF bolus.  CXR as per ED possible PNA,.  BCX sent. Pt started on Azithromycin and Ceftriaxone (22 Dec 2019 09:38)    INTERVAL HPI/ OVERNIGHT EVENTS: Pt was seen and examined, OOB to chair, looks anxious, shivering on/off. VS stable.  Was able to eat some food.   12/24/19: Patient seen and examined. On CO for safety. Confused.  12/25/19: Patient seen and examined. No new issues.   12/26/19: Patient seen and examined. Not in distress. Poor oral intake. Family requesting for palliative eval    Vital Signs Last 24 Hrs  T(C): 36.7 (26 Dec 2019 12:31), Max: 36.8 (25 Dec 2019 20:36)  T(F): 98 (26 Dec 2019 12:31), Max: 98.2 (25 Dec 2019 20:36)  HR: 79 (26 Dec 2019 14:20) (74 - 95)  BP: 112/66 (26 Dec 2019 12:31) (112/66 - 134/69)  BP(mean): --  RR: 18 (26 Dec 2019 12:31) (18 - 18)  SpO2: 95% (26 Dec 2019 14:20) (93% - 99%)      REVIEW OF SYSTEMS:  All other review of systems is negative unless indicated above.      PHYSICAL EXAM:  General: Well developed; malnourished;  anxious, confused   Eyes: PERRLA; conjunctiva and sclera clear  Head: Normocephalic; atraumatic  ENMT: No nasal discharge; airway clear  Neck: Supple; non tender; no masses	  Respiratory: Decreased BS b/l.  No wheezes, rales or rhonchi  Cardiovascular: Regular rate and rhythm. S1 and S2 Normal; No murmurs  Gastrointestinal: Soft non-tender non-distended; Normal bowel sounds  Genitourinary: No  suprapubic fullness  Extremities: Normal range of motion, No  edema  Vascular: Peripheral pulses palpable 2+ bilaterally  Neurological: Alert and oriented x1, confused   Skin: Warm and dry.   Musculoskeletal: Normal muscle tone, without deformities      LABS           MEDICATIONS  (STANDING):  albuterol/ipratropium for Nebulization 3 milliLiter(s) Nebulizer every 6 hours  azithromycin  IVPB 500 milliGRAM(s) IV Intermittent every 24 hours  buDESOnide    Inhalation Suspension 0.5 milliGRAM(s) Inhalation every 12 hours  cefTRIAXone Injectable. 1000 milliGRAM(s) IV Push every 24 hours  donepezil 10 milliGRAM(s) Oral at bedtime  fluticasone propionate 50 MICROgram(s)/spray Nasal Spray 1 Spray(s) Both Nostrils two times a day  guaiFENesin ER 1200 milliGRAM(s) Oral every 12 hours  heparin  Injectable 5000 Unit(s) SubCutaneous every 12 hours  memantine 10 milliGRAM(s) Oral daily  metoprolol tartrate 25 milliGRAM(s) Oral two times a day  sodium chloride 0.9%. 1000 milliLiter(s) (75 mL/Hr) IV Continuous <Continuous>    MEDICATIONS  (PRN):  acetaminophen   Tablet .. 650 milliGRAM(s) Oral every 6 hours PRN Mild Pain (1 - 3)  aluminum hydroxide/magnesium hydroxide/simethicone Suspension 30 milliLiter(s) Oral every 4 hours PRN Dyspepsia  bisacodyl 5 milliGRAM(s) Oral daily PRN Constipation  ondansetron Injectable 4 milliGRAM(s) IV Push every 6 hours PRN Nausea  senna 2 Tablet(s) Oral at bedtime PRN Constipation  sodium chloride 0.65% Nasal 2 Spray(s) Both Nostrils every 4 hours PRN Nasal Congestion

## 2019-12-26 NOTE — PROGRESS NOTE ADULT - SUBJECTIVE AND OBJECTIVE BOX
Subjective:    pat better, no respiratory complaint.    Home Medications:  amoxicillin: since 12/18 (22 Dec 2019 09:36)  Aricept 10 mg oral tablet: 1 tab(s) orally once a day (at bedtime) (22 Dec 2019 09:36)  ferrous sulfate 325 mg (65 mg elemental iron) oral tablet: 0.5 tab(s) orally once a day (22 Dec 2019 09:36)  metoprolol tartrate 25 mg oral tablet: 1 tab(s) orally 2 times a day (22 Dec 2019 09:36)  Namenda XR 14 mg oral capsule, extended release: 1 cap(s) orally once a day (22 Dec 2019 09:36)  Sudafed:  (22 Dec 2019 09:36)    MEDICATIONS  (STANDING):  albuterol/ipratropium for Nebulization 3 milliLiter(s) Nebulizer every 6 hours  buDESOnide    Inhalation Suspension 0.5 milliGRAM(s) Inhalation every 12 hours  donepezil 10 milliGRAM(s) Oral at bedtime  fluticasone propionate 50 MICROgram(s)/spray Nasal Spray 1 Spray(s) Both Nostrils two times a day  guaiFENesin ER 1200 milliGRAM(s) Oral every 12 hours  heparin  Injectable 5000 Unit(s) SubCutaneous every 12 hours  memantine 10 milliGRAM(s) Oral daily  metoprolol tartrate 25 milliGRAM(s) Oral two times a day    MEDICATIONS  (PRN):  acetaminophen   Tablet .. 650 milliGRAM(s) Oral every 6 hours PRN Mild Pain (1 - 3)  aluminum hydroxide/magnesium hydroxide/simethicone Suspension 30 milliLiter(s) Oral every 4 hours PRN Dyspepsia  bisacodyl 5 milliGRAM(s) Oral daily PRN Constipation  ondansetron Injectable 4 milliGRAM(s) IV Push every 6 hours PRN Nausea  senna 2 Tablet(s) Oral at bedtime PRN Constipation  sodium chloride 0.65% Nasal 2 Spray(s) Both Nostrils every 4 hours PRN Nasal Congestion      Allergies    No Known Allergies    Intolerances        Vital Signs Last 24 Hrs  T(C): 36.7 (26 Dec 2019 05:07), Max: 36.8 (25 Dec 2019 20:36)  T(F): 98.1 (26 Dec 2019 05:07), Max: 98.2 (25 Dec 2019 20:36)  HR: 74 (26 Dec 2019 09:50) (74 - 95)  BP: 134/69 (26 Dec 2019 05:07) (125/59 - 134/69)  BP(mean): --  RR: 18 (26 Dec 2019 05:07) (18 - 18)  SpO2: 94% (26 Dec 2019 09:50) (93% - 99%)      PHYSICAL EXAMINATION:    NECK:  Supple. No lymphadenopathy. Jugular venous pressure not elevated. Carotids equal.   HEART:   The cardiac impulse has a normal quality. Reg., Nl S1 and S2.  There are no murmurs, rubs or gallops noted  CHEST:  Chest crackles to auscultation. Normal respiratory effort.  ABDOMEN:  Soft and nontender.   EXTREMITIES:  There is no edema.       LABS:

## 2019-12-27 NOTE — GOALS OF CARE CONVERSATION - ADVANCED CARE PLANNING - CONVERSATION DETAILS
met with daughter and son in law with who patient lives with. Patients daughter shared that patient has been significantly declining. Her son in law t this time is the one who takes care of her, and he just suffered a stroke himself. Patients daughters main concern is that her mother is comfortable. Discussed that at times patient doesn't take medications orally family states is the same at home. and they would like to try to take her home but if unable to control symptoms then patient would be need to be transitioned to inpatient hospice at that time. Family is in agreement and would like a referral made to [facility], who called while we were meeting.    family confirmed DNR/I status.     Emotional support provided will continue to follow

## 2019-12-27 NOTE — GOALS OF CARE CONVERSATION - ADVANCED CARE PLANNING - DOES PATIENT HAVE ADVANCE DIRECTIVE
Pt unable to state reason for admission- poor historian. Will pass on to day RN to follow up with family./Yes

## 2019-12-27 NOTE — PROGRESS NOTE ADULT - ASSESSMENT
89 y.o female with PMH of HTN and Dementia admitted for:     1. Febrile syndrome/Sepsis  due to acute bronchitis/ suspected PNA  CT chest showed beronchial thickening and opacities and possible mass   Lactate improved  C/w IV Ceftriaxone for one more day  S/p Gentle hydration   F/u BCX: NGTD  Mucinex      2. Elevated CR, likely PARVIZ due to poor oral intake  vs CKD    S/p IVF bolus and  gentle hydration   Monitor UO       4. HTN  BP stable  C/w Metoprolol       5. Advanced  Dementia   supportive care  Fall precautions   C/w Namenda, decrease dose as CrCL low   C/w Aricept   Palliative eval appreciate    6. Moderate Protein Calorie Malnutrition   due to  advancing dementia and  poor oral intake  supplements   Dietician eval  appreciated      7. DVT PPXs

## 2019-12-27 NOTE — CONSULT NOTE ADULT - ASSESSMENT
Pt is a 89y old Female with hx HTN and Dementia was brought by EMS for weakness and chills. Pt Is unable to provide history  due to dementia and also was given ativan overnight due to agitation. Patient daughter stated that she lives at home with her and her . She noticed over the past couple of days that the patient was coughing and looking week. She was treated with Amoxicillin and Sudafed on 12/18/2019. After getting a little better then took a decline.   Daughter noted that Pt's appetite has gradually decreased but significantly stopped eating or drinking for the past week or so.      1) Advance dementia   - supportive care  - Fall precautions   - c/w home meds Namenda and Aricept     2) Sepsis    - due to acute bronchitis  - suspected PNA  - CT chest showed noted (bronchial thickening and opacities and possible mass)  - IV Ceftriaxone as per ID     3) Moderate Protein Calorie Malnutrition   - due to advancing dementia and poor oral intake  - Dietician magdalena  appreciated  - supportive care     4) advance directives   - daughter named as surrogate decision makes   - GOC meeting today 12/27/19 at 2pm

## 2019-12-27 NOTE — PROGRESS NOTE ADULT - SUBJECTIVE AND OBJECTIVE BOX
Subjective:    pat condition un changed, waiting for hospice.    Home Medications:  amoxicillin: since 12/18 (22 Dec 2019 09:36)  Aricept 10 mg oral tablet: 1 tab(s) orally once a day (at bedtime) (22 Dec 2019 09:36)  ferrous sulfate 325 mg (65 mg elemental iron) oral tablet: 0.5 tab(s) orally once a day (22 Dec 2019 09:36)  metoprolol tartrate 25 mg oral tablet: 1 tab(s) orally 2 times a day (22 Dec 2019 09:36)  Namenda XR 14 mg oral capsule, extended release: 1 cap(s) orally once a day (22 Dec 2019 09:36)  Sudafed:  (22 Dec 2019 09:36)    MEDICATIONS  (STANDING):  albuterol/ipratropium for Nebulization 3 milliLiter(s) Nebulizer every 6 hours  buDESOnide    Inhalation Suspension 0.5 milliGRAM(s) Inhalation every 12 hours  cefTRIAXone Injectable. 1000 milliGRAM(s) IV Push every 24 hours  donepezil 10 milliGRAM(s) Oral at bedtime  fluticasone propionate 50 MICROgram(s)/spray Nasal Spray 1 Spray(s) Both Nostrils two times a day  guaiFENesin ER 1200 milliGRAM(s) Oral every 12 hours  heparin  Injectable 5000 Unit(s) SubCutaneous every 12 hours  memantine 10 milliGRAM(s) Oral daily  metoprolol tartrate 25 milliGRAM(s) Oral two times a day    MEDICATIONS  (PRN):  acetaminophen   Tablet .. 650 milliGRAM(s) Oral every 6 hours PRN Mild Pain (1 - 3)  aluminum hydroxide/magnesium hydroxide/simethicone Suspension 30 milliLiter(s) Oral every 4 hours PRN Dyspepsia  bisacodyl 5 milliGRAM(s) Oral daily PRN Constipation  ondansetron Injectable 4 milliGRAM(s) IV Push every 6 hours PRN Nausea  senna 2 Tablet(s) Oral at bedtime PRN Constipation  sodium chloride 0.65% Nasal 2 Spray(s) Both Nostrils every 4 hours PRN Nasal Congestion      Allergies    No Known Allergies    Intolerances        Vital Signs Last 24 Hrs  T(C): 36.8 (27 Dec 2019 11:11), Max: 36.8 (27 Dec 2019 11:11)  T(F): 98.2 (27 Dec 2019 11:11), Max: 98.2 (27 Dec 2019 11:11)  HR: 112 (27 Dec 2019 11:11) (62 - 112)  BP: 106/77 (27 Dec 2019 11:11) (106/77 - 140/51)  BP(mean): --  RR: 19 (27 Dec 2019 05:05) (18 - 19)  SpO2: 95% (27 Dec 2019 09:30) (93% - 96%)      PHYSICAL EXAMINATION:    NECK:  Supple. No lymphadenopathy. Jugular venous pressure not elevated. Carotids equal.   HEART:   The cardiac impulse has a normal quality. Reg., Nl S1 and S2.  There are no murmurs, rubs or gallops noted  CHEST:  Chest is clear to auscultation. Normal respiratory effort.  ABDOMEN:  Soft and nontender.   EXTREMITIES:  There is no edema.       LABS:

## 2019-12-27 NOTE — CONSULT NOTE ADULT - SUBJECTIVE AND OBJECTIVE BOX
HPI: Pt is a 89y old Female with hx HTN and Dementia was brought by EMS for weakness and chills. Pt Is unable to provide history  due to dementia and also was given ativan overnight due to agitation. Patient daughter stated that she lives at home with her and her . She noticed over the past couple of days that the patient was coughing and looking week. She was treated with Amoxicillin and Sudafed on 12/18/2019. After getting a little better then took a decline.   Daughter noted that Pt's appetite has gradually decreased but significantly stopped eating or drinking for the past week or so.    12/27/2019 patient seen and examined with no family at bedside. Patient appears comfortable at this time. Set up GOC meeting for 2pm today.      PAIN: ( )Yes   (x )No  unable to verbalize patient appears comfortable at this time     DYSPNEA: ( ) Yes  (x ) No    PAST MEDICAL & SURGICAL HISTORY:  Dementia  HTN (hypertension)  S/P cholecystectomy  S/P hip replacement, right      SOCIAL HX:    Hx opiate tolerance ( )YES  (x )NO    Baseline ADLs  (Prior to Admission)  ( ) Independent   (x )Dependent    FAMILY HISTORY:  FH: liver cancer: mother  FH ischemic heart disease: father      Review of Systems:  Unable to obtain/Limited due to: advanced dementia       PHYSICAL EXAM:    Vital Signs Last 24 Hrs  T(C): 36.8 (27 Dec 2019 11:11), Max: 36.8 (27 Dec 2019 11:11)  T(F): 98.2 (27 Dec 2019 11:11), Max: 98.2 (27 Dec 2019 11:11)  HR: 112 (27 Dec 2019 11:11) (62 - 112)  BP: 106/77 (27 Dec 2019 11:11) (106/77 - 140/51)  RR: 19 (27 Dec 2019 05:05) (18 - 19)  SpO2: 95% (27 Dec 2019 09:30) (94% - 96%)    PPSV2: 20  %  FAST: 7d    General: NAD, elderly ill women in bed.   Mental Status: alert completely disoriented   HEENT: extremely dry oral mucosa, + temporal wasting   Lungs: diminished breath sounds b/l, with non productive cough and upper airway congestion   Cardiac: s1s2+   GI: non distended + bowel sounds  : incontinent   Ext: no edema     LABS:    Albumin: Albumin, Serum: 2.6 g/dL (12-21 @ 21:07)      Allergies    No Known Allergies    Intolerances      MEDICATIONS  (STANDING):  albuterol/ipratropium for Nebulization 3 milliLiter(s) Nebulizer every 6 hours  buDESOnide    Inhalation Suspension 0.5 milliGRAM(s) Inhalation every 12 hours  cefTRIAXone Injectable. 1000 milliGRAM(s) IV Push every 24 hours  donepezil 10 milliGRAM(s) Oral at bedtime  fluticasone propionate 50 MICROgram(s)/spray Nasal Spray 1 Spray(s) Both Nostrils two times a day  guaiFENesin ER 1200 milliGRAM(s) Oral every 12 hours  heparin  Injectable 5000 Unit(s) SubCutaneous every 12 hours  memantine 10 milliGRAM(s) Oral daily  metoprolol tartrate 25 milliGRAM(s) Oral two times a day    MEDICATIONS  (PRN):  acetaminophen   Tablet .. 650 milliGRAM(s) Oral every 6 hours PRN Mild Pain (1 - 3)  aluminum hydroxide/magnesium hydroxide/simethicone Suspension 30 milliLiter(s) Oral every 4 hours PRN Dyspepsia  bisacodyl 5 milliGRAM(s) Oral daily PRN Constipation  ondansetron Injectable 4 milliGRAM(s) IV Push every 6 hours PRN Nausea  senna 2 Tablet(s) Oral at bedtime PRN Constipation  sodium chloride 0.65% Nasal 2 Spray(s) Both Nostrils every 4 hours PRN Nasal Congestion      RADIOLOGY/ADDITIONAL STUDIES:  < from: CT Chest No Cont (12.22.19 @ 09:57) >  EXAM:  CT CHEST                            PROCEDURE DATE:  12/22/2019          INTERPRETATION:  CT CHEST WITHOUT CONTRAST    Clinical Indication: Comments, weakness and fevers    Technique: Axial CT images of the chest were obtained from the lung apices to the diaphragms without IV contrast.  Coronal and sagittal reformatted images were created and reviewed.    Comparison: Prior radiograph of the chest from today, 12/2/2019. Radiograph of the chest from 1/17/2014. CT of the chest from 4/2/2014.    Findings:  Biapical pleural-based scarring/thickening is more prominent compared to the prior study from 2014. There is a 2.6 x 1.7 x 1.8 cm pleural-based, masslike opacity at the left apex (for example see coronal image 40, series 601), which isnew compared to the prior exam.    There is a small right pleural effusion and trace left pleural effusion. Mild patchy groundglass opacities are seen bilaterally, most prominent in the right lower lobe. Mild tree-in-bud nodularity is also seen in the lateral left upper lobe. There is mild diffuse bronchial wall thickening with scattered foci of airway impaction, most prominent in the right lower lobe. Emphysematous changes are seen at the bilateral apices. Linear appearing coarsening of bronchovascular markings at the right upper lobe likely represents sequelae of old infection.  There is no pneumothorax. Thickening of interlobular septae could represent underlying interstitial edema. Scattered 3 mm calcified granulomata are noted.    There is a 1.1 cm short axis pretracheal node, without significant change from prior study from 2014. A few additional prominent/borderline enlarged mediastinal nodes are also without significant change. There is no axillary lymphadenopathy.  Evaluation forhilar lymphadenopathy is limited without IV contrast, however there is no gross hilar lymphadenopathy. Evaluation of the heart demonstrates coronary artery calcifications and a prominent left atrium. Aortic annular versus aortic valvular calcifications are noted. The blood pool in the heart is hypodense relative to myocardium, suggesting anemia. There is no sizable pericardial effusion. The ascending and descending aorta are not enlarged. Moderate atherosclerotic calcifications of the thoracic aorta are noted. The pulmonary artery is not enlarged.    Periportal edema is suspected in the liver. The included upper abdomen is otherwise grossly unremarkable although limited without oral or IV contrast.    No aggressive osseous lesion is identified.    IMPRESSION:  1.  There is a 2.6 x 1.7 x 1.8 cm pleural-based, masslike opacity at the left apex (for example see coronal image 40, series 601), which is new compared to the prior 2014 exam. Recommend further assessment with PET CT and/or tissue biopsy.  In addition, recommend comparison to more recent CTs of the chest, if available.      2.  Small right pleural effusion and trace left pleural effusion. Mild patchy groundglass opacities are seen bilaterally, most prominent in the right lower lobe. Mild tree-in-bud nodularity is also seen in the lateral left upper lobe. Mild diffuse bronchial wall thickening with scattered foci of airway impaction, most prominent in the right lower lobe.  Constellation of findings is suggestive of age indeterminate bronchitis with small airways infection/inflammation.  Recommend clinical correlation and followup study in 4 to 6 weeks following treatment to ensure resolution.    3.  Emphysematous changes are seen at the bilateral apices. Linear appearing coarsening of bronchovascular markings at the right upper lobe likely represents sequelae of old infection.       4.  Thickening of interlobular septae could represent underlying interstitial edema.    5.  The blood pool in the heart is hypodense relative to myocardium, suggesting anemia. Correlate with laboratory values.    6.  Coronary artery calcifications and a prominent left atrium. Aortic annular versus aortic valvular calcifications are noted..     7.  Prominent/borderline enlarged mediastinal nodes are without significant change from 2014.    8.  Other findings, as above.       < end of copied text >  < from: US Duplex Venous Lower Ext Ltd, Left (12.22.19 @ 02:43) >  EXAM:  US DPLX LWR EXT VEINS LTD LT                            PROCEDURE DATE:  12/22/2019          INTERPRETATION:  CLINICAL INFORMATION: Leg swelling, rule out DVT.    COMPARISON: None available.    TECHNIQUE: Duplex sonography of the LEFT LOWER extremity veins with color and spectral Doppler, with and without compression.      FINDINGS:    There is normal compressibility of the left common femoral, femoral and popliteal veins.     The contralateral common femoral vein is patent.    Doppler examination shows normal spontaneous and phasic flow.    No calf vein thrombosis is detected noting that only the posterior tibial vein was identified.    IMPRESSION:     No evidence of left lower extremity deep venous thrombosis.    < end of copied text >  < from: Xray Chest 1 View- PORTABLE-Urgent (12.22.19 @ 00:51) >    EXAM:  XR CHEST PORTABLE URGENT 1V                            PROCEDURE DATE:  12/22/2019          INTERPRETATION:  History: Fever.    Single view of the chest was performed.    Comparison is made to April 17, 2014.    Impression:    Examination islimited as the extreme lung bases are excluded from the field-of-view. Nonspecific prominent interstitial markings are noted bilaterally, most prominent within the right lung apex. There is a possible small left pleural effusion. Heart size is normal.    < end of copied text >

## 2019-12-27 NOTE — PROGRESS NOTE ADULT - SUBJECTIVE AND OBJECTIVE BOX
CC: weakness (23 Dec 2019 15:57)    HPI: 89 y.o female with PMH of HTN and Dementia was brought by EMS for weakness and chills. Pt Is unable to provide history  due to dementia and also was given ativan overnight due to agitation.  History  obtained from Pts daughter who reports that Pt was having cough and looking weak for couple of days, was seen at Dr Ray's office and start Tx on amoxicillin and Sudafed for suspected sinusitis on 12/18. Cough somewhat got better, but Pt was getting weaker and start having chills and was c/o feeling cold.  Also Pt had min oral intake.  Daughter noted that Pts appetite and Po intake decreased over past 6 month but had min food or fluids past 1-2 days. No fevers. No other complains from Pt but as per daughter has bad dementia and can not provide history.    In ED: temp 102, mildly elevated lactate, improved with IVF bolus.  CXR as per ED possible PNA,.  BCX sent. Pt started on Azithromycin and Ceftriaxone (22 Dec 2019 09:38)    INTERVAL HPI/ OVERNIGHT EVENTS: Pt was seen and examined, OOB to chair, looks anxious, shivering on/off. VS stable.  Was able to eat some food.   12/24/19: Patient seen and examined. On CO for safety. Confused.  12/25/19: Patient seen and examined. No new issues.   12/26/19: Patient seen and examined. Not in distress. Poor oral intake. Family requesting for palliative eval  12/27/19: Patient seen and examined. Restless, moaning, confused.     Vital Signs Last 24 Hrs  T(C): 36.8 (27 Dec 2019 11:11), Max: 36.8 (27 Dec 2019 11:11)  T(F): 98.2 (27 Dec 2019 11:11), Max: 98.2 (27 Dec 2019 11:11)  HR: 112 (27 Dec 2019 11:11) (62 - 112)  BP: 106/77 (27 Dec 2019 11:11) (106/77 - 140/51)  BP(mean): --  RR: 19 (27 Dec 2019 05:05) (18 - 19)  SpO2: 95% (27 Dec 2019 09:30) (94% - 96%)      REVIEW OF SYSTEMS:  All other review of systems is negative unless indicated above.      PHYSICAL EXAM:  General: Well developed; malnourished;  anxious, confused   Eyes: PERRLA; conjunctiva and sclera clear  Head: Normocephalic; atraumatic  ENMT: No nasal discharge; airway clear  Neck: Supple; non tender; no masses	  Respiratory: Decreased BS b/l.  No wheezes, rales or rhonchi  Cardiovascular: Regular rate and rhythm. S1 and S2 Normal; No murmurs  Gastrointestinal: Soft non-tender non-distended; Normal bowel sounds  Genitourinary: No  suprapubic fullness  Extremities: Normal range of motion, No  edema  Vascular: Peripheral pulses palpable 2+ bilaterally  Neurological: Alert and oriented x1, confused   Skin: Warm and dry.   Musculoskeletal: Normal muscle tone, without deformities      LABS             MEDICATIONS  (STANDING):  albuterol/ipratropium for Nebulization 3 milliLiter(s) Nebulizer every 6 hours  azithromycin  IVPB 500 milliGRAM(s) IV Intermittent every 24 hours  buDESOnide    Inhalation Suspension 0.5 milliGRAM(s) Inhalation every 12 hours  cefTRIAXone Injectable. 1000 milliGRAM(s) IV Push every 24 hours  donepezil 10 milliGRAM(s) Oral at bedtime  fluticasone propionate 50 MICROgram(s)/spray Nasal Spray 1 Spray(s) Both Nostrils two times a day  guaiFENesin ER 1200 milliGRAM(s) Oral every 12 hours  heparin  Injectable 5000 Unit(s) SubCutaneous every 12 hours  memantine 10 milliGRAM(s) Oral daily  metoprolol tartrate 25 milliGRAM(s) Oral two times a day  sodium chloride 0.9%. 1000 milliLiter(s) (75 mL/Hr) IV Continuous <Continuous>    MEDICATIONS  (PRN):  acetaminophen   Tablet .. 650 milliGRAM(s) Oral every 6 hours PRN Mild Pain (1 - 3)  aluminum hydroxide/magnesium hydroxide/simethicone Suspension 30 milliLiter(s) Oral every 4 hours PRN Dyspepsia  bisacodyl 5 milliGRAM(s) Oral daily PRN Constipation  ondansetron Injectable 4 milliGRAM(s) IV Push every 6 hours PRN Nausea  senna 2 Tablet(s) Oral at bedtime PRN Constipation  sodium chloride 0.65% Nasal 2 Spray(s) Both Nostrils every 4 hours PRN Nasal Congestion

## 2019-12-28 NOTE — PROGRESS NOTE ADULT - SUBJECTIVE AND OBJECTIVE BOX
CC: weakness (23 Dec 2019 15:57)    HPI: 89 y.o female with PMH of HTN and Dementia was brought by EMS for weakness and chills. Pt Is unable to provide history  due to dementia and also was given ativan overnight due to agitation.  History  obtained from Pts daughter who reports that Pt was having cough and looking weak for couple of days, was seen at Dr Ray's office and start Tx on amoxicillin and Sudafed for suspected sinusitis on 12/18. Cough somewhat got better, but Pt was getting weaker and start having chills and was c/o feeling cold.  Also Pt had min oral intake.  Daughter noted that Pts appetite and Po intake decreased over past 6 month but had min food or fluids past 1-2 days. No fevers. No other complains from Pt but as per daughter has bad dementia and can not provide history.    In ED: temp 102, mildly elevated lactate, improved with IVF bolus.  CXR as per ED possible PNA,.  BCX sent. Pt started on Azithromycin and Ceftriaxone (22 Dec 2019 09:38)    INTERVAL HPI/ OVERNIGHT EVENTS: Pt was seen and examined, OOB to chair, looks anxious, shivering on/off. VS stable.  Was able to eat some food.   12/24/19: Patient seen and examined. On CO for safety. Confused.  12/25/19: Patient seen and examined. No new issues.   12/26/19: Patient seen and examined. Not in distress. Poor oral intake. Family requesting for palliative eval  12/27/19: Patient seen and examined. Restless, moaning, confused.   12/28/19: Patient seen and examined. Discussed with daughter, will not be able to take care of her at home and looking for inpatient hospice or hospice in a facility.     Vital Signs Last 24 Hrs  T(C): 36.3 (28 Dec 2019 11:56), Max: 37.1 (28 Dec 2019 05:23)  T(F): 97.3 (28 Dec 2019 11:56), Max: 98.7 (28 Dec 2019 05:23)  HR: 73 (28 Dec 2019 11:56) (60 - 96)  BP: 159/94 (28 Dec 2019 11:56) (116/68 - 159/94)  BP(mean): --  RR: 18 (28 Dec 2019 11:56) (18 - 19)  SpO2: 91% (28 Dec 2019 11:56) (91% - 94%)    REVIEW OF SYSTEMS:  All other review of systems is negative unless indicated above.      PHYSICAL EXAM:  General: malnourished;  anxious, confused   Eyes: PERRLA; conjunctiva and sclera clear  Head: Normocephalic; atraumatic  ENMT: No nasal discharge; airway clear  Neck: Supple; non tender; no masses	  Respiratory: Decreased BS b/l.  No wheezes, rales or rhonchi  Cardiovascular: Regular rate and rhythm. S1 and S2 Normal; No murmurs  Gastrointestinal: Soft non-tender non-distended; Normal bowel sounds  Genitourinary: No  suprapubic fullness  Extremities: Normal range of motion, No  edema  Vascular: Peripheral pulses palpable 2+ bilaterally  Neurological: Alert and oriented x1, confused   Skin: Warm and dry.   Musculoskeletal: Normal muscle tone, without deformities      LABS             MEDICATIONS  (STANDING):  albuterol/ipratropium for Nebulization 3 milliLiter(s) Nebulizer every 6 hours  azithromycin  IVPB 500 milliGRAM(s) IV Intermittent every 24 hours  buDESOnide    Inhalation Suspension 0.5 milliGRAM(s) Inhalation every 12 hours  cefTRIAXone Injectable. 1000 milliGRAM(s) IV Push every 24 hours  donepezil 10 milliGRAM(s) Oral at bedtime  fluticasone propionate 50 MICROgram(s)/spray Nasal Spray 1 Spray(s) Both Nostrils two times a day  guaiFENesin ER 1200 milliGRAM(s) Oral every 12 hours  heparin  Injectable 5000 Unit(s) SubCutaneous every 12 hours  memantine 10 milliGRAM(s) Oral daily  metoprolol tartrate 25 milliGRAM(s) Oral two times a day  sodium chloride 0.9%. 1000 milliLiter(s) (75 mL/Hr) IV Continuous <Continuous>    MEDICATIONS  (PRN):  acetaminophen   Tablet .. 650 milliGRAM(s) Oral every 6 hours PRN Mild Pain (1 - 3)  aluminum hydroxide/magnesium hydroxide/simethicone Suspension 30 milliLiter(s) Oral every 4 hours PRN Dyspepsia  bisacodyl 5 milliGRAM(s) Oral daily PRN Constipation  ondansetron Injectable 4 milliGRAM(s) IV Push every 6 hours PRN Nausea  senna 2 Tablet(s) Oral at bedtime PRN Constipation  sodium chloride 0.65% Nasal 2 Spray(s) Both Nostrils every 4 hours PRN Nasal Congestion

## 2019-12-28 NOTE — PROGRESS NOTE ADULT - SUBJECTIVE AND OBJECTIVE BOX
Subjective:    pat no new complaint.    Home Medications:  amoxicillin: since 12/18 (22 Dec 2019 09:36)  Aricept 10 mg oral tablet: 1 tab(s) orally once a day (at bedtime) (22 Dec 2019 09:36)  ferrous sulfate 325 mg (65 mg elemental iron) oral tablet: 0.5 tab(s) orally once a day (22 Dec 2019 09:36)  metoprolol tartrate 25 mg oral tablet: 1 tab(s) orally 2 times a day (22 Dec 2019 09:36)  Namenda XR 14 mg oral capsule, extended release: 1 cap(s) orally once a day (22 Dec 2019 09:36)  Sudafed:  (22 Dec 2019 09:36)    MEDICATIONS  (STANDING):  albuterol/ipratropium for Nebulization 3 milliLiter(s) Nebulizer every 6 hours  buDESOnide    Inhalation Suspension 0.5 milliGRAM(s) Inhalation every 12 hours  donepezil 10 milliGRAM(s) Oral at bedtime  fluticasone propionate 50 MICROgram(s)/spray Nasal Spray 1 Spray(s) Both Nostrils two times a day  guaiFENesin ER 1200 milliGRAM(s) Oral every 12 hours  heparin  Injectable 5000 Unit(s) SubCutaneous every 12 hours  memantine 10 milliGRAM(s) Oral daily  metoprolol tartrate 25 milliGRAM(s) Oral two times a day    MEDICATIONS  (PRN):  acetaminophen   Tablet .. 650 milliGRAM(s) Oral every 6 hours PRN Mild Pain (1 - 3)  aluminum hydroxide/magnesium hydroxide/simethicone Suspension 30 milliLiter(s) Oral every 4 hours PRN Dyspepsia  bisacodyl 5 milliGRAM(s) Oral daily PRN Constipation  ondansetron Injectable 4 milliGRAM(s) IV Push every 6 hours PRN Nausea  senna 2 Tablet(s) Oral at bedtime PRN Constipation  sodium chloride 0.65% Nasal 2 Spray(s) Both Nostrils every 4 hours PRN Nasal Congestion      Allergies    No Known Allergies    Intolerances        Vital Signs Last 24 Hrs  T(C): 37.1 (28 Dec 2019 05:23), Max: 37.1 (28 Dec 2019 05:23)  T(F): 98.7 (28 Dec 2019 05:23), Max: 98.7 (28 Dec 2019 05:23)  HR: 60 (28 Dec 2019 05:23) (60 - 96)  BP: 153/88 (28 Dec 2019 05:23) (116/68 - 153/88)  BP(mean): --  RR: 18 (28 Dec 2019 05:23) (18 - 19)  SpO2: 92% (28 Dec 2019 05:23) (92% - 94%)      PHYSICAL EXAMINATION:    NECK:  Supple. No lymphadenopathy. Jugular venous pressure not elevated. Carotids equal.   HEART:   The cardiac impulse has a normal quality. Reg., Nl S1 and S2.  There are no murmurs, rubs or gallops noted  CHEST:  Chest is clear to auscultation. Normal respiratory effort.  ABDOMEN:  Soft and nontender.   EXTREMITIES:  There is no edema.       LABS:

## 2019-12-28 NOTE — PROGRESS NOTE ADULT - ASSESSMENT
PROBLEMS:    Febrile syndrome/Sepsis possible pneumonia/asthmatic bronchitis  GAIL mass  HTN  Advanced  Dementia  Severe Protein Calorie Malnutrition     PLAN;    PULMONARY STABLE- hospice placement  PAT CT CHEST GAIL MASS VS SCAR NEEDS FU CT VS PET SCAN AS OUTPAT IN 2 MONTHS  SUPPORTIVE CARE  POOR PROGNOSIS  D/W STAFF   DVT PROPHYLASIX

## 2019-12-28 NOTE — PROGRESS NOTE ADULT - ASSESSMENT
89 y.o female with PMH of HTN and Dementia admitted for:     1. Febrile syndrome/Sepsis  due to acute bronchitis/ suspected PNA  CT chest showed beronchial thickening and opacities and possible mass   Lactate improved  D/C IV Ceftriaxone   S/p Gentle hydration   F/u BCX: NGTD  Mucinex      2. Elevated CR, likely PARVIZ due to poor oral intake  vs CKD    S/p IVF bolus and  gentle hydration   Monitor UO       4. HTN  BP stable  C/w Metoprolol       5. Advanced  Dementia   supportive care  Fall precautions   Palliative eval appreciate    6. Moderate Protein Calorie Malnutrition   due to  advancing dementia and  poor oral intake  supplements   Dietician eval  appreciated    7. DVT PPXs    Disposition: in patient Vs hospice in a facility possibly on Monday

## 2019-12-29 NOTE — PROGRESS NOTE ADULT - ASSESSMENT
89 y.o female with PMH of HTN and Dementia admitted for:     1. Febrile syndrome/Sepsis  due to acute bronchitis/ suspected PNA  CT chest showed beronchial thickening and opacities and possible mass   Lactate improved  S/P IV Ceftriaxone   S/p Gentle hydration   F/u BCX: NGTD    2. Elevated CR, likely PARVIZ due to poor oral intake  vs CKD    IV fluid    4. HTN  BP stable  C/w Metoprolol     5. Advanced  Dementia   supportive care  Fall precautions   Palliative eval appreciate    6. Moderate Protein Calorie Malnutrition   due to  advancing dementia and  poor oral intake  supplements   Dietician eval  appreciated    7. DVT PPXs    Disposition: in patient Vs hospice in a facility possibly in 1 or 2 days

## 2019-12-29 NOTE — PROGRESS NOTE ADULT - SUBJECTIVE AND OBJECTIVE BOX
CC: weakness (23 Dec 2019 15:57)    HPI: 89 y.o female with PMH of HTN and Dementia was brought by EMS for weakness and chills. Pt Is unable to provide history  due to dementia and also was given ativan overnight due to agitation.  History  obtained from Pts daughter who reports that Pt was having cough and looking weak for couple of days, was seen at Dr Ray's office and start Tx on amoxicillin and Sudafed for suspected sinusitis on 12/18. Cough somewhat got better, but Pt was getting weaker and start having chills and was c/o feeling cold.  Also Pt had min oral intake.  Daughter noted that Pts appetite and Po intake decreased over past 6 month but had min food or fluids past 1-2 days. No fevers. No other complains from Pt but as per daughter has bad dementia and can not provide history.    In ED: temp 102, mildly elevated lactate, improved with IVF bolus.  CXR as per ED possible PNA,.  BCX sent. Pt started on Azithromycin and Ceftriaxone (22 Dec 2019 09:38)    INTERVAL HPI/ OVERNIGHT EVENTS: Pt was seen and examined, OOB to chair, looks anxious, shivering on/off. VS stable.  Was able to eat some food.   12/24/19: Patient seen and examined. On CO for safety. Confused.  12/25/19: Patient seen and examined. No new issues.   12/26/19: Patient seen and examined. Not in distress. Poor oral intake. Family requesting for palliative eval  12/27/19: Patient seen and examined. Restless, moaning, confused.   12/28/19: Patient seen and examined. Discussed with daughter, will not be able to take care of her at home and looking for inpatient hospice or hospice in a facility.   12/29/19: Patient seen and examined. Confused, no oral intake as per RN.     Vital Signs Last 24 Hrs  T(C): 36.7 (29 Dec 2019 11:20), Max: 36.7 (29 Dec 2019 11:20)  T(F): 98 (29 Dec 2019 11:20), Max: 98 (29 Dec 2019 11:20)  HR: 95 (29 Dec 2019 11:20) (73 - 95)  BP: 135/97 (29 Dec 2019 11:20) (131/94 - 159/94)  BP(mean): --  RR: 21 (29 Dec 2019 11:20) (16 - 21)  SpO2: 94% (29 Dec 2019 11:20) (91% - 94%)    REVIEW OF SYSTEMS:  All other review of systems is negative unless indicated above.      PHYSICAL EXAM:  General: malnourished;  anxious, confused   Eyes: PERRLA; conjunctiva and sclera clear  Head: Normocephalic; atraumatic  ENMT: No nasal discharge; airway clear  Neck: Supple; non tender; no masses	  Respiratory: Decreased BS b/l.  No wheezes, rales or rhonchi  Cardiovascular: Regular rate and rhythm. S1 and S2 Normal; No murmurs  Gastrointestinal: Soft non-tender non-distended; Normal bowel sounds  Genitourinary: No  suprapubic fullness  Extremities: Normal range of motion, No  edema  Vascular: Peripheral pulses palpable 2+ bilaterally  Neurological: Alert and oriented x1, confused   Skin: Warm and dry.   Musculoskeletal: Normal muscle tone, without deformities      LABS           MEDICATIONS  (STANDING):  albuterol/ipratropium for Nebulization 3 milliLiter(s) Nebulizer every 6 hours  buDESOnide    Inhalation Suspension 0.5 milliGRAM(s) Inhalation every 12 hours  fluticasone propionate 50 MICROgram(s)/spray Nasal Spray 1 Spray(s) Both Nostrils two times a day  guaiFENesin ER 1200 milliGRAM(s) Oral every 12 hours  heparin  Injectable 5000 Unit(s) SubCutaneous every 12 hours  metoprolol tartrate 25 milliGRAM(s) Oral two times a day    MEDICATIONS  (PRN):  acetaminophen   Tablet .. 650 milliGRAM(s) Oral every 6 hours PRN Mild Pain (1 - 3)  aluminum hydroxide/magnesium hydroxide/simethicone Suspension 30 milliLiter(s) Oral every 4 hours PRN Dyspepsia  bisacodyl 5 milliGRAM(s) Oral daily PRN Constipation  LORazepam   Injectable 0.5 milliGRAM(s) IV Push every 6 hours PRN Anxiety  ondansetron Injectable 4 milliGRAM(s) IV Push every 6 hours PRN Nausea  senna 2 Tablet(s) Oral at bedtime PRN Constipation  sodium chloride 0.65% Nasal 2 Spray(s) Both Nostrils every 4 hours PRN Nasal Congestion

## 2019-12-30 NOTE — PROGRESS NOTE ADULT - SUBJECTIVE AND OBJECTIVE BOX
HPI: patient seen and examined with family at bedside. Patient laying in bed. NAD noted, patient not responsive at this time.     PAIN: (x )Yes   ( )No  unable to asses   DYSPNEA: ( x) Yes  ( ) No  Level: moderate     Review of Systems:    Unable to obtain/Limited due to: advanced dementia     PHYSICAL EXAM:    Vital Signs Last 24 Hrs  T(C): 36.2 (30 Dec 2019 11:34), Max: 36.7 (29 Dec 2019 21:32)  T(F): 97.1 (30 Dec 2019 11:34), Max: 98 (29 Dec 2019 21:32)  HR: 80 (30 Dec 2019 11:34) (58 - 80)  BP: 145/81 (30 Dec 2019 11:34) (126/102 - 146/91)  RR: 20 (30 Dec 2019 11:34) (20 - 20)  SpO2: 95% (30 Dec 2019 11:34) (95% - 96%)    PPSV2: 10 %  FAST: 7d    General: NAD, elderly ill women in bed.   Mental Status: alert completely disoriented   HEENT: extremely dry oral mucosa, + temporal wasting   Lungs: diminished breath sounds b/l, with non productive cough and upper airway congestion   Cardiac: s1s2+   GI: non distended + bowel sounds  : incontinent   Ext: no edema     LABS:    Albumin:     Allergies  No Known Allergies    Intolerances      MEDICATIONS  (STANDING):  albuterol/ipratropium for Nebulization 3 milliLiter(s) Nebulizer every 6 hours  buDESOnide    Inhalation Suspension 0.5 milliGRAM(s) Inhalation every 12 hours  fluticasone propionate 50 MICROgram(s)/spray Nasal Spray 1 Spray(s) Both Nostrils two times a day  guaiFENesin ER 1200 milliGRAM(s) Oral every 12 hours  heparin  Injectable 5000 Unit(s) SubCutaneous every 12 hours  metoprolol tartrate 25 milliGRAM(s) Oral two times a day    MEDICATIONS  (PRN):  acetaminophen   Tablet .. 650 milliGRAM(s) Oral every 6 hours PRN Mild Pain (1 - 3)  aluminum hydroxide/magnesium hydroxide/simethicone Suspension 30 milliLiter(s) Oral every 4 hours PRN Dyspepsia  bisacodyl 5 milliGRAM(s) Oral daily PRN Constipation  LORazepam   Injectable 0.5 milliGRAM(s) IV Push every 6 hours PRN Anxiety  morphine  - Injectable 2 milliGRAM(s) IV Push every 4 hours PRN Severe Pain (7 - 10)  ondansetron Injectable 4 milliGRAM(s) IV Push every 6 hours PRN Nausea  senna 2 Tablet(s) Oral at bedtime PRN Constipation  sodium chloride 0.65% Nasal 2 Spray(s) Both Nostrils every 4 hours PRN Nasal Congestion

## 2019-12-30 NOTE — PROGRESS NOTE ADULT - SUBJECTIVE AND OBJECTIVE BOX
CC: weakness (23 Dec 2019 15:57)    HPI: 89 y.o female with PMH of HTN and Dementia was brought by EMS for weakness and chills. Pt Is unable to provide history  due to dementia and also was given ativan overnight due to agitation.  History  obtained from Pts daughter who reports that Pt was having cough and looking weak for couple of days, was seen at Dr Ray's office and start Tx on amoxicillin and Sudafed for suspected sinusitis on 12/18. Cough somewhat got better, but Pt was getting weaker and start having chills and was c/o feeling cold.  Also Pt had min oral intake.  Daughter noted that Pts appetite and Po intake decreased over past 6 month but had min food or fluids past 1-2 days. No fevers. No other complains from Pt but as per daughter has bad dementia and can not provide history.    In ED: temp 102, mildly elevated lactate, improved with IVF bolus.  CXR as per ED possible PNA,.  BCX sent. Pt started on Azithromycin and Ceftriaxone (22 Dec 2019 09:38)    INTERVAL HPI/ OVERNIGHT EVENTS: Pt was seen and examined, OOB to chair, looks anxious, shivering on/off. VS stable.  Was able to eat some food.   12/24/19: Patient seen and examined. On CO for safety. Confused.  12/25/19: Patient seen and examined. No new issues.   12/26/19: Patient seen and examined. Not in distress. Poor oral intake. Family requesting for palliative eval  12/27/19: Patient seen and examined. Restless, moaning, confused.   12/28/19: Patient seen and examined. Discussed with daughter, will not be able to take care of her at home and looking for inpatient hospice or hospice in a facility.   12/29/19: Patient seen and examined. Confused, no oral intake as per RN.   12/30: Pt lying in bed, unresponsive, thin, acutely ill appearing.  Non-responsive.  Spoke with family at bedside, goal is comfort measures.    REVIEW OF SYSTEMS: Unable to determine due to AMS/dementia    Vital Signs Last 24 Hrs  T(C): 36.2 (30 Dec 2019 11:34), Max: 36.7 (29 Dec 2019 21:32)  T(F): 97.1 (30 Dec 2019 11:34), Max: 98 (29 Dec 2019 21:32)  HR: 80 (30 Dec 2019 11:34) (58 - 80)  BP: 145/81 (30 Dec 2019 11:34) (126/102 - 146/91)  BP(mean): --  RR: 20 (30 Dec 2019 11:34) (20 - 20)  SpO2: 95% (30 Dec 2019 11:34) (95% - 96%)    PHYSICAL EXAM:  General: malnourished;  thin, sick appearing  Eyes: PERRLA; conjunctiva and sclera clear  Head: Normocephalic; atraumatic  ENMT: No nasal discharge; airway clear  Neck: Supple; non tender; no masses	  Respiratory: Decreased BS b/l.  No wheezes, rales or rhonchi  Cardiovascular: Regular rate and rhythm. S1 and S2 Normal; No murmurs  Gastrointestinal: Soft non-tender non-distended; Normal bowel sounds  Genitourinary: No  suprapubic fullness  Extremities: Normal range of motion, No  edema  Vascular: Peripheral pulses palpable 2+ bilaterally  Neurological: Alert and oriented x 0, confused   Skin: Warm and dry.   Musculoskeletal: Normal muscle tone, without deformities    MEDICATIONS  (STANDING):  albuterol/ipratropium for Nebulization 3 milliLiter(s) Nebulizer every 6 hours  buDESOnide    Inhalation Suspension 0.5 milliGRAM(s) Inhalation every 12 hours  fluticasone propionate 50 MICROgram(s)/spray Nasal Spray 1 Spray(s) Both Nostrils two times a day  guaiFENesin ER 1200 milliGRAM(s) Oral every 12 hours  heparin  Injectable 5000 Unit(s) SubCutaneous every 12 hours  metoprolol tartrate 25 milliGRAM(s) Oral two times a day    MEDICATIONS  (PRN):  acetaminophen   Tablet .. 650 milliGRAM(s) Oral every 6 hours PRN Mild Pain (1 - 3)  aluminum hydroxide/magnesium hydroxide/simethicone Suspension 30 milliLiter(s) Oral every 4 hours PRN Dyspepsia  bisacodyl 5 milliGRAM(s) Oral daily PRN Constipation  LORazepam   Injectable 0.5 milliGRAM(s) IV Push every 6 hours PRN Anxiety  morphine  - Injectable 2 milliGRAM(s) IV Push every 4 hours PRN Severe Pain (7 - 10)  ondansetron Injectable 4 milliGRAM(s) IV Push every 6 hours PRN Nausea  senna 2 Tablet(s) Oral at bedtime PRN Constipation  sodium chloride 0.65% Nasal 2 Spray(s) Both Nostrils every 4 hours PRN Nasal Congestion                CAPILLARY BLOOD GLUCOSE                Assessment and Plan:  89 y.o female with PMH of HTN and Dementia admitted for:     Advanced dementia with sepsis secondary to acute bronchitis/ suspected PNA with moderate protein calorie malnutrition, and renal failure:    -Poor prognosis, pt declining.  Focus on comfort/hospice measures per family wishes.  -palliative services following  -morphine PRN  -hold off on discharge to hospice due to instability of patient at this time.

## 2019-12-30 NOTE — PROGRESS NOTE ADULT - SUBJECTIVE AND OBJECTIVE BOX
Subjective:    pat condition unchanged.    Home Medications:  amoxicillin: since 12/18 (22 Dec 2019 09:36)  Aricept 10 mg oral tablet: 1 tab(s) orally once a day (at bedtime) (22 Dec 2019 09:36)  ferrous sulfate 325 mg (65 mg elemental iron) oral tablet: 0.5 tab(s) orally once a day (22 Dec 2019 09:36)  metoprolol tartrate 25 mg oral tablet: 1 tab(s) orally 2 times a day (22 Dec 2019 09:36)  Namenda XR 14 mg oral capsule, extended release: 1 cap(s) orally once a day (22 Dec 2019 09:36)  Sudafed:  (22 Dec 2019 09:36)    MEDICATIONS  (STANDING):  albuterol/ipratropium for Nebulization 3 milliLiter(s) Nebulizer every 6 hours  buDESOnide    Inhalation Suspension 0.5 milliGRAM(s) Inhalation every 12 hours  fluticasone propionate 50 MICROgram(s)/spray Nasal Spray 1 Spray(s) Both Nostrils two times a day  guaiFENesin ER 1200 milliGRAM(s) Oral every 12 hours  heparin  Injectable 5000 Unit(s) SubCutaneous every 12 hours  metoprolol tartrate 25 milliGRAM(s) Oral two times a day  sodium chloride 0.9%. 1000 milliLiter(s) (60 mL/Hr) IV Continuous <Continuous>    MEDICATIONS  (PRN):  acetaminophen   Tablet .. 650 milliGRAM(s) Oral every 6 hours PRN Mild Pain (1 - 3)  aluminum hydroxide/magnesium hydroxide/simethicone Suspension 30 milliLiter(s) Oral every 4 hours PRN Dyspepsia  bisacodyl 5 milliGRAM(s) Oral daily PRN Constipation  LORazepam   Injectable 0.5 milliGRAM(s) IV Push every 6 hours PRN Anxiety  morphine  - Injectable 2 milliGRAM(s) IV Push every 4 hours PRN Severe Pain (7 - 10)  ondansetron Injectable 4 milliGRAM(s) IV Push every 6 hours PRN Nausea  senna 2 Tablet(s) Oral at bedtime PRN Constipation  sodium chloride 0.65% Nasal 2 Spray(s) Both Nostrils every 4 hours PRN Nasal Congestion      Allergies    No Known Allergies    Intolerances        Vital Signs Last 24 Hrs  T(C): 36.4 (30 Dec 2019 04:45), Max: 36.7 (29 Dec 2019 21:32)  T(F): 97.6 (30 Dec 2019 04:45), Max: 98 (29 Dec 2019 21:32)  HR: 58 (30 Dec 2019 04:45) (58 - 58)  BP: 126/102 (30 Dec 2019 04:45) (126/102 - 146/91)  BP(mean): --  RR: 20 (30 Dec 2019 04:45) (20 - 20)  SpO2: 96% (30 Dec 2019 04:45) (96% - 96%)      PHYSICAL EXAMINATION:    NECK:  Supple. No lymphadenopathy. Jugular venous pressure not elevated. Carotids equal.   HEART:   The cardiac impulse has a normal quality. Reg., Nl S1 and S2.  There are no murmurs, rubs or gallops noted  CHEST:  Chest is clear to auscultation. Normal respiratory effort.  ABDOMEN:  Soft and nontender.   EXTREMITIES:  There is no edema.       LABS:

## 2019-12-30 NOTE — PROGRESS NOTE ADULT - ASSESSMENT
PROBLEMS:    Febrile syndrome/Sepsis possible pneumonia/asthmatic bronchitis  GAIL mass  HTN  Advanced  Dementia  Severe Protein Calorie Malnutrition     PLAN;    PULMONARY STABLE- hospice placement  SUPPORTIVE CARE  POOR PROGNOSIS  D/W STAFF   DVT PROPHYLASIX

## 2019-12-30 NOTE — PROGRESS NOTE ADULT - ASSESSMENT
Pt is a 89y old Female with hx HTN and Dementia was brought by EMS for weakness and chills. Pt Is unable to provide history  due to dementia and also was given ativan overnight due to agitation. Patient daughter stated that she lives at home with her and her . She noticed over the past couple of days that the patient was coughing and looking week. She was treated with Amoxicillin and Sudafed on 12/18/2019. After getting a little better then took a decline.   Daughter noted that Pt's appetite has gradually decreased but significantly stopped eating or drinking for the past week or so.      1) Advance dementia   - supportive care  - Fall precautions   - c/w home meds Namenda and Aricept - patient unable to take any PO meds a this time     2) Sepsis    - due to acute bronchitis  - suspected PNA  - CT chest showed noted (bronchial thickening and opacities and possible mass)  - IV Ceftriaxone as per ID     3) Moderate Protein Calorie Malnutrition   - due to advancing dementia and poor oral intake  - Dietician magdalena  appreciated  - supportive care     4) advance directives   - daughter named as surrogate decision maker - Family has opted for comfort focus at this time. family was called over night due to the patients change in status. Will hold off on referral to hospice at this time as patients family is concerned about how quickly she is doing worse. Will continue to monitor and follow.   - GOC meeting today 12/27/19 at 2pm

## 2019-12-30 NOTE — CHART NOTE - NSCHARTNOTEFT_GEN_A_CORE
Was called by RN to as patient found to have tachycardia and a-fib w/ RVR. Reviewed paper chart and electronic chart. It appears that goals of care conversation occurred on 12/27. However, there was no MOLST form in the paper chart, only a completed capacity form was in the chart. Given that patient appeared to be tachypnic with agonal type breathing and tachycardic, and floor RN reported that patient's condition has deteriorated since the start of her shift, called daughter to inform of patient's condition and also to complete MOLST form. Daughter confirmed that patient was DNR / DNI. Explained to daughter regarding A-fib w/ RVR, but daughter states she only wants her mother to be comfortable and doesn't even want any antibiotics / anticoagulation or management other than comfort care. Advance directives <20 minutes

## 2019-12-31 NOTE — PROGRESS NOTE ADULT - SUBJECTIVE AND OBJECTIVE BOX
HPI: patient seen and examined with family at bedside. Patient laying in bed. NAD noted, patient not responsive at this time.       PAIN: (x )Yes   ( )No  unable to asses   DYSPNEA: ( x) Yes  ( ) No  Level: moderate     Review of Systems:    Unable to obtain/Limited due to: advanced dementia  and unresponsiveness     PHYSICAL EXAM:    Vital Signs Last 24 Hrs  T(C): 36.8 (31 Dec 2019 04:53), Max: 36.8 (31 Dec 2019 04:53)  T(F): 98.2 (31 Dec 2019 04:53), Max: 98.2 (31 Dec 2019 04:53)  HR: 95 (31 Dec 2019 04:53) (91 - 95)  BP: 133/65 (31 Dec 2019 04:53) (101/52 - 133/65)  RR: 22 (31 Dec 2019 04:53) (20 - 22)  SpO2: 95% (31 Dec 2019 04:53) (95% - 96%)    PPSV2: 10  %    General: NAD, elderly ill women in bed.   Mental Status: alert completely disoriented   HEENT: extremely dry oral mucosa, + temporal wasting   Lungs: diminished breath sounds b/l, with non productive cough and upper airway congestion   Cardiac: s1s2+   GI: non distended + bowel sounds  : incontinent   Ext: no edema     LABS:    Albumin:     Allergies    No Known Allergies    Intolerances      MEDICATIONS  (STANDING):  albuterol/ipratropium for Nebulization 3 milliLiter(s) Nebulizer every 6 hours  buDESOnide    Inhalation Suspension 0.5 milliGRAM(s) Inhalation every 12 hours  fluticasone propionate 50 MICROgram(s)/spray Nasal Spray 1 Spray(s) Both Nostrils two times a day  guaiFENesin ER 1200 milliGRAM(s) Oral every 12 hours  heparin  Injectable 5000 Unit(s) SubCutaneous every 12 hours  metoprolol tartrate 25 milliGRAM(s) Oral two times a day    MEDICATIONS  (PRN):  acetaminophen   Tablet .. 650 milliGRAM(s) Oral every 6 hours PRN Mild Pain (1 - 3)  aluminum hydroxide/magnesium hydroxide/simethicone Suspension 30 milliLiter(s) Oral every 4 hours PRN Dyspepsia  bisacodyl 5 milliGRAM(s) Oral daily PRN Constipation  LORazepam   Injectable 0.5 milliGRAM(s) IV Push every 6 hours PRN Anxiety  morphine  - Injectable 2 milliGRAM(s) IV Push every 2 hours PRN Severe Pain (7 - 10)  ondansetron Injectable 4 milliGRAM(s) IV Push every 6 hours PRN Nausea  senna 2 Tablet(s) Oral at bedtime PRN Constipation  sodium chloride 0.65% Nasal 2 Spray(s) Both Nostrils every 4 hours PRN Nasal Congestion

## 2019-12-31 NOTE — PROGRESS NOTE ADULT - REASON FOR ADMISSION
weakness

## 2019-12-31 NOTE — PROGRESS NOTE ADULT - ASSESSMENT
1) Advance dementia   - supportive care  - Fall precautions   - patient unable to take any PO meds a this time     2) dyspnea   - morphine 2mg q2hrs 	    2) Sepsis    - due to acute bronchitis  - suspected PNA  - CT chest showed noted (bronchial thickening and opacities and possible mass)  - IV Ceftriaxone as per ID     3) Moderate Protein Calorie Malnutrition   - due to advancing dementia and poor oral intake  - Dietician magdalena  appreciated  - supportive care     4) advance directives   - daughter named as surrogate decision maker - Family has opted for comfort focus at this time. family was called over night due to the patients change in status. Will hold off on referral to hospice at this time as patients family is concerned about how quickly she is doing worse. Will continue to monitor and follow.

## 2019-12-31 NOTE — PROGRESS NOTE ADULT - SUBJECTIVE AND OBJECTIVE BOX
Subjective:    pat condition unchanged    Home Medications:  amoxicillin: since 12/18 (22 Dec 2019 09:36)  Aricept 10 mg oral tablet: 1 tab(s) orally once a day (at bedtime) (22 Dec 2019 09:36)  ferrous sulfate 325 mg (65 mg elemental iron) oral tablet: 0.5 tab(s) orally once a day (22 Dec 2019 09:36)  metoprolol tartrate 25 mg oral tablet: 1 tab(s) orally 2 times a day (22 Dec 2019 09:36)  Namenda XR 14 mg oral capsule, extended release: 1 cap(s) orally once a day (22 Dec 2019 09:36)  Sudafed:  (22 Dec 2019 09:36)    MEDICATIONS  (STANDING):  albuterol/ipratropium for Nebulization 3 milliLiter(s) Nebulizer every 6 hours  buDESOnide    Inhalation Suspension 0.5 milliGRAM(s) Inhalation every 12 hours  fluticasone propionate 50 MICROgram(s)/spray Nasal Spray 1 Spray(s) Both Nostrils two times a day  guaiFENesin ER 1200 milliGRAM(s) Oral every 12 hours  heparin  Injectable 5000 Unit(s) SubCutaneous every 12 hours  metoprolol tartrate 25 milliGRAM(s) Oral two times a day    MEDICATIONS  (PRN):  acetaminophen   Tablet .. 650 milliGRAM(s) Oral every 6 hours PRN Mild Pain (1 - 3)  aluminum hydroxide/magnesium hydroxide/simethicone Suspension 30 milliLiter(s) Oral every 4 hours PRN Dyspepsia  bisacodyl 5 milliGRAM(s) Oral daily PRN Constipation  LORazepam   Injectable 0.5 milliGRAM(s) IV Push every 6 hours PRN Anxiety  morphine  - Injectable 2 milliGRAM(s) IV Push every 2 hours PRN Severe Pain (7 - 10)  ondansetron Injectable 4 milliGRAM(s) IV Push every 6 hours PRN Nausea  senna 2 Tablet(s) Oral at bedtime PRN Constipation  sodium chloride 0.65% Nasal 2 Spray(s) Both Nostrils every 4 hours PRN Nasal Congestion      Allergies    No Known Allergies    Intolerances        Vital Signs Last 24 Hrs  T(C): 36.8 (31 Dec 2019 04:53), Max: 36.8 (31 Dec 2019 04:53)  T(F): 98.2 (31 Dec 2019 04:53), Max: 98.2 (31 Dec 2019 04:53)  HR: 95 (31 Dec 2019 04:53) (91 - 95)  BP: 133/65 (31 Dec 2019 04:53) (101/52 - 133/65)  BP(mean): --  RR: 22 (31 Dec 2019 04:53) (20 - 22)  SpO2: 95% (31 Dec 2019 04:53) (95% - 96%)      PHYSICAL EXAMINATION:    NECK:  Supple. No lymphadenopathy. Jugular venous pressure not elevated. Carotids equal.   HEART:   The cardiac impulse has a normal quality. Reg., Nl S1 and S2.  There are no murmurs, rubs or gallops noted  CHEST:  Chest is clear to auscultation. Normal respiratory effort.  ABDOMEN:  Soft and nontender.   EXTREMITIES:  There is no edema.       LABS:

## 2019-12-31 NOTE — PROGRESS NOTE ADULT - SUBJECTIVE AND OBJECTIVE BOX
CC: weakness (23 Dec 2019 15:57)    HPI: 89 y.o female with PMH of HTN and Dementia was brought by EMS for weakness and chills. Pt Is unable to provide history  due to dementia and also was given ativan overnight due to agitation.  History  obtained from Pts daughter who reports that Pt was having cough and looking weak for couple of days, was seen at Dr Ray's office and start Tx on amoxicillin and Sudafed for suspected sinusitis on 12/18. Cough somewhat got better, but Pt was getting weaker and start having chills and was c/o feeling cold.  Also Pt had min oral intake.  Daughter noted that Pts appetite and Po intake decreased over past 6 month but had min food or fluids past 1-2 days. No fevers. No other complains from Pt but as per daughter has bad dementia and can not provide history.    In ED: temp 102, mildly elevated lactate, improved with IVF bolus.  CXR as per ED possible PNA,.  BCX sent. Pt started on Azithromycin and Ceftriaxone (22 Dec 2019 09:38)    INTERVAL HPI/ OVERNIGHT EVENTS: Pt was seen and examined, OOB to chair, looks anxious, shivering on/off. VS stable.  Was able to eat some food.   12/24/19: Patient seen and examined. On CO for safety. Confused.  12/25/19: Patient seen and examined. No new issues.   12/26/19: Patient seen and examined. Not in distress. Poor oral intake. Family requesting for palliative eval  12/27/19: Patient seen and examined. Restless, moaning, confused.   12/28/19: Patient seen and examined. Discussed with daughter, will not be able to take care of her at home and looking for inpatient hospice or hospice in a facility.   12/29/19: Patient seen and examined. Confused, no oral intake as per RN.   12/30: Pt lying in bed, unresponsive, thin, acutely ill appearing.  Non-responsive.  Spoke with family at bedside, goal is comfort measures.  12/31: Obtunded, non-verbal.  Appears comfortable.  Spoke with family at bedside, will continue hospice/comfort care.     REVIEW OF SYSTEMS: Unable to determine due to AMS/dementia    Vital Signs Last 24 Hrs  T(C): 36.8 (31 Dec 2019 04:53), Max: 36.8 (31 Dec 2019 04:53)  T(F): 98.2 (31 Dec 2019 04:53), Max: 98.2 (31 Dec 2019 04:53)  HR: 95 (31 Dec 2019 04:53) (91 - 95)  BP: 133/65 (31 Dec 2019 04:53) (101/52 - 133/65)  BP(mean): --  RR: 22 (31 Dec 2019 04:53) (20 - 22)  SpO2: 95% (31 Dec 2019 04:53) (95% - 96%)    PHYSICAL EXAM:  General: malnourished;  thin, sick appearing  Eyes: PERRLA; conjunctiva and sclera clear  Head: Normocephalic; atraumatic  ENMT: No nasal discharge; airway clear  Neck: Supple; non tender; no masses	  Respiratory: Decreased BS b/l.  No wheezes, rales or rhonchi  Cardiovascular: Regular rate and rhythm. S1 and S2 Normal; No murmurs  Gastrointestinal: Soft non-tender non-distended; Normal bowel sounds  Genitourinary: No  suprapubic fullness  Extremities: Normal range of motion, No  edema  Vascular: Peripheral pulses palpable 2+ bilaterally  Neurological: Alert and oriented x 0, confused   Skin: Warm and dry.   Musculoskeletal: Normal muscle tone, without deformities    med/labs: Reviewed and interpreted         Assessment and Plan:  89 y.o female with PMH of HTN and Dementia admitted for:     Advanced dementia with sepsis secondary to acute bronchitis/ suspected PNA with moderate protein calorie malnutrition, and renal failure:    -Poor prognosis, pt declining.  Focus on comfort/hospice measures per family wishes.  -palliative services following  -morphine PRN  -hold off on discharge to hospice due to instability of patient at this time.

## 2019-12-31 NOTE — CHART NOTE - NSCHARTNOTEFT_GEN_A_CORE
Assessment:     *pt being followed by palliative and GOC discussed 12/27; as per MOLST form in chart pt is comfort measures. Pt was planned to be d/cd to hospice however d/t change in status d/c on hold. Pt noted w/ poor prognosis and declining status.   *RD discussed pt w/ RN who reports pt w/ little to no PO intake at this time. Pt is currently on DASH diet and receiving ensure enlive BID. Recommend provide nutrition w/in GOC.       Recommendations:    1. provide nutrition w/in GOC   2. reconsult nutrition PRN        Diet Presciption: Diet, DASH/TLC:   Sodium & Cholesterol Restricted (12-22-19 @ 01:02)      Wt Hx:  Height (cm): 152.4 (12-21-19 @ 20:07)  Weight (kg): 52.3 (12-22-19 @ 02:45)  BMI (kg/m2): 22.5 (12-22-19 @ 02:45)        Estimated Needs:   [x] no change since previous assessment    Estimated Energy Needs (calories/kg):  · Weight Used for Calculation  admission  52.3kg    Estimated Energy Needs (25-30 calories/kg):  · Weight  (lbs)  137.3 lb  · Weight (kg)  62.3 kg  · From (25cal/kg)  1557  · To (30cal/kg)  1869    Other Calculation:  · Other Calculation  Ht.  60"     Wt.  52.3Kg        BMI 22.5      IBW   45Kg      Pt is at    116%  IBW    Estimated Protein Needs (1.4-1.6 g/kg):  · Weight  (lbs)  115.3  · Weight (kg)  52.3 kg  · From (1.4 g/kg)  73.22  · To (1.6 g/kg)  83.68    Estimated Fluid Needs (30-35 ml/kg):  · Weight  (lbs)  115.3  · Weight (kg)  52.3  · From (30 ml/kg)  1569  · To (35 ml/kg)  1830      Nutrition Diagnosis is [x ] ongoing  [ ] resolved [ ] not applicable         New Nutrition Diagnosis: [x ] not applicable     Nutrition Diagnostic Terminology #1 Malnutrition...     Etiology inadequate PO intake 2/2 hx of dementia.     Signs/Symptoms moderate muscle wasting, moderate fat depletion, PO intake < 75% ENN > 1 month.     Goal/Expected Outcome: Provide nutrition w/in GOC       Pertinent Medications: MEDICATIONS  (STANDING):  albuterol/ipratropium for Nebulization 3 milliLiter(s) Nebulizer every 6 hours  buDESOnide    Inhalation Suspension 0.5 milliGRAM(s) Inhalation every 12 hours  fluticasone propionate 50 MICROgram(s)/spray Nasal Spray 1 Spray(s) Both Nostrils two times a day  guaiFENesin ER 1200 milliGRAM(s) Oral every 12 hours  heparin  Injectable 5000 Unit(s) SubCutaneous every 12 hours  metoprolol tartrate 25 milliGRAM(s) Oral two times a day    MEDICATIONS  (PRN):  acetaminophen   Tablet .. 650 milliGRAM(s) Oral every 6 hours PRN Mild Pain (1 - 3)  aluminum hydroxide/magnesium hydroxide/simethicone Suspension 30 milliLiter(s) Oral every 4 hours PRN Dyspepsia  bisacodyl 5 milliGRAM(s) Oral daily PRN Constipation  LORazepam   Injectable 0.5 milliGRAM(s) IV Push every 6 hours PRN Anxiety  morphine  - Injectable 2 milliGRAM(s) IV Push every 2 hours PRN Severe Pain (7 - 10)  ondansetron Injectable 4 milliGRAM(s) IV Push every 6 hours PRN Nausea  senna 2 Tablet(s) Oral at bedtime PRN Constipation  sodium chloride 0.65% Nasal 2 Spray(s) Both Nostrils every 4 hours PRN Nasal Congestion    Pertinent Labs:      CAPILLARY BLOOD GLUCOSE          Skin: asif score =           Monitoring and Evaluation:   [x] PO intake/Nutr support infusion [ x ] Tolerance to Nutr [ x ] weights [ x ] labs[ x ] follow up per protocol  [ ] other:

## 2019-12-31 NOTE — PROGRESS NOTE ADULT - ASSESSMENT
PROBLEMS:    Febrile syndrome/Sepsis possible pneumonia/asthmatic bronchitis  GAIL mass  HTN  Advanced  Dementia  Severe Protein Calorie Malnutrition     PLAN;    PULMONARY STABLE- hospice placement-fu prn  SUPPORTIVE CARE  POOR PROGNOSIS  D/W STAFF   DVT PROPHYLASIX

## 2020-01-01 RX ORDER — ACETAMINOPHEN 500 MG
650 TABLET ORAL ONCE
Refills: 0 | Status: COMPLETED | OUTPATIENT
Start: 2020-01-01 | End: 2020-01-01

## 2020-01-01 RX ADMIN — MORPHINE SULFATE 2 MILLIGRAM(S): 50 CAPSULE, EXTENDED RELEASE ORAL at 04:45

## 2020-01-01 RX ADMIN — MORPHINE SULFATE 2 MILLIGRAM(S): 50 CAPSULE, EXTENDED RELEASE ORAL at 01:20

## 2020-01-01 RX ADMIN — Medication 650 MILLIGRAM(S): at 05:09

## 2020-01-01 RX ADMIN — MORPHINE SULFATE 2 MILLIGRAM(S): 50 CAPSULE, EXTENDED RELEASE ORAL at 07:15

## 2020-01-01 NOTE — DISCHARGE NOTE FOR THE EXPIRED PATIENT - HOSPITAL COURSE
CC: Weakness    HPI: 89 y.o female with PMH of HTN and Dementia was brought by EMS for weakness and chills. Pt Is unable to provide history  due to dementia and also was given ativan overnight due to agitation.  History  obtained from Pts daughter who reports that Pt was having cough and looking weak for couple of days, was seen at Dr Ray's office and start Tx on amoxicillin and Sudafed for suspected sinusitis on 12/18. Cough somewhat got better, but Pt was getting weaker and start having chills and was c/o feeling cold.  Also Pt had min oral intake.  Daughter noted that Pts appetite and Po intake decreased over past 6 month but had min food or fluids past 1-2 days. No fevers. No other complains from Pt but as per daughter has bad dementia and can not provide history.    In ED: temp 102, mildly elevated lactate, improved with IVF bolus.  CXR as per ED possible PNA,.  BCX sent. Pt started on Azithromycin and Ceftriaxone (22 Dec 2019 09:38)    INTERVAL HPI/ OVERNIGHT EVENTS: Pt was seen and examined, OOB to chair, looks anxious, shivering on/off. VS stable.  Was able to eat some food.   12/24/19: Patient seen and examined. On CO for safety. Confused.  12/25/19: Patient seen and examined. No new issues.   12/26/19: Patient seen and examined. Not in distress. Poor oral intake. Family requesting for palliative eval  12/27/19: Patient seen and examined. Restless, moaning, confused.   12/28/19: Patient seen and examined. Discussed with daughter, will not be able to take care of her at home and looking for inpatient hospice or hospice in a facility.   12/29/19: Patient seen and examined. Confused, no oral intake as per RN.   12/30: Pt lying in bed, unresponsive, thin, acutely ill appearing.  Non-responsive.  Spoke with family at bedside, goal is comfort measures.  12/31: Obtunded, non-verbal.  Appears comfortable.  Spoke with family at bedside, will continue hospice/comfort care.   1/1: Pt unresponsive at about 9:26AM, she was pronounced dead at 9:30AM.  Family at bedside, told of her death.        PE:  pupils dilated and fixed, no heart or breath sounds, no pulses.    med/labs: Reviewed and interpreted         Assessment and Plan:  89 y.o female with PMH of HTN and Dementia admitted for:     Advanced dementia with sepsis secondary to acute bronchitis/ suspected PNA with moderate protein calorie malnutrition, and renal failure:  Pt was made hospice care 48 hours ago and pronounced dead at 930AM today.  Family present at bedside.

## 2020-01-08 DIAGNOSIS — F03.90 UNSPECIFIED DEMENTIA WITHOUT BEHAVIORAL DISTURBANCE: ICD-10-CM

## 2020-01-08 DIAGNOSIS — I12.9 HYPERTENSIVE CHRONIC KIDNEY DISEASE WITH STAGE 1 THROUGH STAGE 4 CHRONIC KIDNEY DISEASE, OR UNSPECIFIED CHRONIC KIDNEY DISEASE: ICD-10-CM

## 2020-01-08 DIAGNOSIS — Z66 DO NOT RESUSCITATE: ICD-10-CM

## 2020-01-08 DIAGNOSIS — J20.9 ACUTE BRONCHITIS, UNSPECIFIED: ICD-10-CM

## 2020-01-08 DIAGNOSIS — E44.0 MODERATE PROTEIN-CALORIE MALNUTRITION: ICD-10-CM

## 2020-01-08 DIAGNOSIS — N18.9 CHRONIC KIDNEY DISEASE, UNSPECIFIED: ICD-10-CM

## 2020-01-08 DIAGNOSIS — Z51.5 ENCOUNTER FOR PALLIATIVE CARE: ICD-10-CM

## 2020-01-08 DIAGNOSIS — R91.8 OTHER NONSPECIFIC ABNORMAL FINDING OF LUNG FIELD: ICD-10-CM

## 2020-01-08 DIAGNOSIS — Z96.641 PRESENCE OF RIGHT ARTIFICIAL HIP JOINT: ICD-10-CM

## 2020-01-08 DIAGNOSIS — A41.9 SEPSIS, UNSPECIFIED ORGANISM: ICD-10-CM

## 2020-01-08 DIAGNOSIS — Z79.2 LONG TERM (CURRENT) USE OF ANTIBIOTICS: ICD-10-CM

## 2020-01-08 DIAGNOSIS — N17.9 ACUTE KIDNEY FAILURE, UNSPECIFIED: ICD-10-CM

## 2020-01-08 DIAGNOSIS — J18.9 PNEUMONIA, UNSPECIFIED ORGANISM: ICD-10-CM

## 2020-01-08 DIAGNOSIS — I48.91 UNSPECIFIED ATRIAL FIBRILLATION: ICD-10-CM

## 2020-06-26 NOTE — PATIENT PROFILE ADULT - FALL HARM RISK CONCLUSION
Team called to bedside for bradycardia (HR 34).  No new orders.  See flowsheets for more details.  Will continue to monitor.   Fall with Harm Risk

## 2021-01-08 NOTE — PATIENT PROFILE ADULT - SPECIFY REASON UNABLE TO ASSESS:
Pt unable to state reason for admission, will pass on to day shift to follow up with family
no diabetes and no thyroid trouble.

## 2023-11-24 NOTE — ED ADULT NURSE NOTE - NSIMPLEMENTINTERV_GEN_ALL_ED
5 Implemented All Universal Safety Interventions:  [place] to call system. Call bell, personal items and telephone within reach. Instruct patient to call for assistance. Room bathroom lighting operational. Non-slip footwear when patient is off stretcher. Physically safe environment: no spills, clutter or unnecessary equipment. Stretcher in lowest position, wheels locked, appropriate side rails in place.

## 2025-04-15 NOTE — GOALS OF CARE CONVERSATION - ADVANCED CARE PLANNING - DOES PATIENT HAVE A SURROGATE
Call placed to discuss US results and offer appointment for hysteroscopy. Spoke with Pt, explained the procedure and why it was recommended for her (heavy bleeding, fibroid noted on US). Pt would like some time to consider it, and review her schedule to arrange. Pt will call back to this office to schedule when she is able. Advised once she is scheduled the office will prior auth with insurance company and let her know the cost if any prior to procedure. Freddie MORELAND    Yes